# Patient Record
Sex: FEMALE | Race: WHITE | HISPANIC OR LATINO | Employment: OTHER | URBAN - METROPOLITAN AREA
[De-identification: names, ages, dates, MRNs, and addresses within clinical notes are randomized per-mention and may not be internally consistent; named-entity substitution may affect disease eponyms.]

---

## 2021-08-16 ENCOUNTER — TELEPHONE (OUTPATIENT)
Dept: OBSTETRICS AND GYNECOLOGY | Facility: CLINIC | Age: 61
End: 2021-08-16

## 2021-08-25 ENCOUNTER — OFFICE VISIT (OUTPATIENT)
Dept: OBSTETRICS AND GYNECOLOGY | Facility: CLINIC | Age: 61
End: 2021-08-25
Payer: MEDICARE

## 2021-08-25 VITALS — BODY MASS INDEX: 50.44 KG/M2 | WEIGHT: 293 LBS | DIASTOLIC BLOOD PRESSURE: 92 MMHG | SYSTOLIC BLOOD PRESSURE: 142 MMHG

## 2021-08-25 DIAGNOSIS — Z12.4 SCREENING FOR MALIGNANT NEOPLASM OF CERVIX: Primary | ICD-10-CM

## 2021-08-25 DIAGNOSIS — N84.1 CERVICAL POLYP: ICD-10-CM

## 2021-08-25 DIAGNOSIS — N95.0 PMB (POSTMENOPAUSAL BLEEDING): ICD-10-CM

## 2021-08-25 PROCEDURE — 3080F DIAST BP >= 90 MM HG: CPT | Mod: CPTII,S$GLB,, | Performed by: NURSE PRACTITIONER

## 2021-08-25 PROCEDURE — G0101 CA SCREEN;PELVIC/BREAST EXAM: HCPCS | Mod: 25,S$GLB,, | Performed by: NURSE PRACTITIONER

## 2021-08-25 PROCEDURE — 3077F SYST BP >= 140 MM HG: CPT | Mod: CPTII,S$GLB,, | Performed by: NURSE PRACTITIONER

## 2021-08-25 PROCEDURE — 3080F PR MOST RECENT DIASTOLIC BLOOD PRESSURE >= 90 MM HG: ICD-10-PCS | Mod: CPTII,S$GLB,, | Performed by: NURSE PRACTITIONER

## 2021-08-25 PROCEDURE — 58100 PR BIOPSY OF UTERUS LINING: ICD-10-PCS | Mod: S$GLB,,, | Performed by: NURSE PRACTITIONER

## 2021-08-25 PROCEDURE — 3008F BODY MASS INDEX DOCD: CPT | Mod: CPTII,S$GLB,, | Performed by: NURSE PRACTITIONER

## 2021-08-25 PROCEDURE — 1159F MED LIST DOCD IN RCRD: CPT | Mod: CPTII,S$GLB,, | Performed by: NURSE PRACTITIONER

## 2021-08-25 PROCEDURE — 1160F PR REVIEW ALL MEDS BY PRESCRIBER/CLIN PHARMACIST DOCUMENTED: ICD-10-PCS | Mod: CPTII,S$GLB,, | Performed by: NURSE PRACTITIONER

## 2021-08-25 PROCEDURE — 88305 TISSUE EXAM BY PATHOLOGIST: ICD-10-PCS | Mod: 26,,, | Performed by: PATHOLOGY

## 2021-08-25 PROCEDURE — 99999 PR PBB SHADOW E&M-EST. PATIENT-LVL IV: ICD-10-PCS | Mod: PBBFAC,,, | Performed by: NURSE PRACTITIONER

## 2021-08-25 PROCEDURE — 1126F PR PAIN SEVERITY QUANTIFIED, NO PAIN PRESENT: ICD-10-PCS | Mod: CPTII,S$GLB,, | Performed by: NURSE PRACTITIONER

## 2021-08-25 PROCEDURE — 1160F RVW MEDS BY RX/DR IN RCRD: CPT | Mod: CPTII,S$GLB,, | Performed by: NURSE PRACTITIONER

## 2021-08-25 PROCEDURE — 99999 PR PBB SHADOW E&M-EST. PATIENT-LVL IV: CPT | Mod: PBBFAC,,, | Performed by: NURSE PRACTITIONER

## 2021-08-25 PROCEDURE — 88305 TISSUE EXAM BY PATHOLOGIST: CPT | Mod: 26,,, | Performed by: PATHOLOGY

## 2021-08-25 PROCEDURE — G0101 PR CA SCREEN;PELVIC/BREAST EXAM: ICD-10-PCS | Mod: 25,S$GLB,, | Performed by: NURSE PRACTITIONER

## 2021-08-25 PROCEDURE — 58100 BIOPSY OF UTERUS LINING: CPT | Mod: S$GLB,,, | Performed by: NURSE PRACTITIONER

## 2021-08-25 PROCEDURE — 88175 CYTOPATH C/V AUTO FLUID REDO: CPT | Performed by: NURSE PRACTITIONER

## 2021-08-25 PROCEDURE — 1126F AMNT PAIN NOTED NONE PRSNT: CPT | Mod: CPTII,S$GLB,, | Performed by: NURSE PRACTITIONER

## 2021-08-25 PROCEDURE — 88305 TISSUE EXAM BY PATHOLOGIST: CPT | Mod: 59 | Performed by: PATHOLOGY

## 2021-08-25 PROCEDURE — 3008F PR BODY MASS INDEX (BMI) DOCUMENTED: ICD-10-PCS | Mod: CPTII,S$GLB,, | Performed by: NURSE PRACTITIONER

## 2021-08-25 PROCEDURE — 3077F PR MOST RECENT SYSTOLIC BLOOD PRESSURE >= 140 MM HG: ICD-10-PCS | Mod: CPTII,S$GLB,, | Performed by: NURSE PRACTITIONER

## 2021-08-25 PROCEDURE — 1159F PR MEDICATION LIST DOCUMENTED IN MEDICAL RECORD: ICD-10-PCS | Mod: CPTII,S$GLB,, | Performed by: NURSE PRACTITIONER

## 2021-08-25 PROCEDURE — 87624 HPV HI-RISK TYP POOLED RSLT: CPT | Performed by: NURSE PRACTITIONER

## 2021-08-25 RX ORDER — CEPHALEXIN 500 MG/1
CAPSULE ORAL
COMMUNITY
End: 2024-03-25

## 2021-08-25 RX ORDER — SULFACETAMIDE SODIUM AND PREDNISOLONE SODIUM PHOSPHATE 100; 2.3 MG/ML; MG/ML
SOLUTION/ DROPS OPHTHALMIC
COMMUNITY

## 2021-08-25 RX ORDER — METOPROLOL SUCCINATE 25 MG/1
TABLET, EXTENDED RELEASE ORAL
COMMUNITY
Start: 2021-03-18

## 2021-08-25 RX ORDER — MELOXICAM 15 MG/1
TABLET ORAL
COMMUNITY
Start: 2021-02-08

## 2021-08-25 RX ORDER — LEVOCETIRIZINE DIHYDROCHLORIDE 5 MG/1
TABLET, FILM COATED ORAL
COMMUNITY
End: 2024-03-25

## 2021-08-25 RX ORDER — ATORVASTATIN CALCIUM 20 MG/1
TABLET, FILM COATED ORAL
COMMUNITY
Start: 2021-08-12

## 2021-08-25 RX ORDER — LISINOPRIL 40 MG/1
TABLET ORAL
COMMUNITY
Start: 2021-06-15

## 2021-08-25 RX ORDER — DICLOFENAC SODIUM 10 MG/G
GEL TOPICAL
COMMUNITY
Start: 2020-09-08

## 2021-08-25 RX ORDER — LANCETS
EACH MISCELLANEOUS
COMMUNITY
Start: 2020-01-27

## 2021-08-25 RX ORDER — ALBUTEROL SULFATE 90 UG/1
AEROSOL, METERED RESPIRATORY (INHALATION)
COMMUNITY
Start: 2013-06-27

## 2021-08-25 RX ORDER — AMLODIPINE BESYLATE 10 MG/1
TABLET ORAL
COMMUNITY
Start: 2021-03-18

## 2021-08-25 RX ORDER — CYCLOBENZAPRINE HCL 10 MG
TABLET ORAL
COMMUNITY
Start: 2021-05-18

## 2021-08-25 RX ORDER — DEXTROSE 4 G
TABLET,CHEWABLE ORAL
COMMUNITY
Start: 2020-01-27

## 2021-08-25 RX ORDER — CEFDINIR 250 MG/5ML
POWDER, FOR SUSPENSION ORAL
COMMUNITY
End: 2024-03-25

## 2021-08-25 RX ORDER — OXYCODONE AND ACETAMINOPHEN 10; 325 MG/1; MG/1
TABLET ORAL
COMMUNITY
Start: 2021-07-15

## 2021-08-25 RX ORDER — FUROSEMIDE 40 MG/1
TABLET ORAL
COMMUNITY
Start: 2020-07-24

## 2021-08-25 RX ORDER — MECLIZINE HYDROCHLORIDE 25 MG/1
TABLET ORAL
COMMUNITY
Start: 2017-09-18 | End: 2024-03-25

## 2021-08-25 RX ORDER — AZELASTINE 1 MG/ML
SPRAY, METERED NASAL
COMMUNITY
End: 2024-03-25 | Stop reason: ALTCHOICE

## 2021-08-25 RX ORDER — HYDROCHLOROTHIAZIDE 25 MG/1
TABLET ORAL
COMMUNITY
Start: 2021-01-19

## 2021-08-25 RX ORDER — HYDROGEN PEROXIDE 3 %
SOLUTION, NON-ORAL MISCELLANEOUS
COMMUNITY

## 2021-08-25 RX ORDER — OXYBUTYNIN CHLORIDE 10 MG/1
TABLET, EXTENDED RELEASE ORAL
COMMUNITY
Start: 2021-04-23

## 2021-08-25 RX ORDER — ONDANSETRON 4 MG/1
TABLET, ORALLY DISINTEGRATING ORAL
COMMUNITY
Start: 2020-08-28 | End: 2024-03-25

## 2021-08-25 RX ORDER — METFORMIN HYDROCHLORIDE 500 MG/5ML
SOLUTION ORAL
COMMUNITY
Start: 2021-01-19 | End: 2024-03-25

## 2021-08-25 RX ORDER — CETIRIZINE HYDROCHLORIDE 10 MG/1
1 TABLET ORAL DAILY
COMMUNITY
Start: 2020-12-16 | End: 2024-03-25

## 2021-08-25 RX ORDER — ESOMEPRAZOLE MAGNESIUM 40 MG/1
CAPSULE, DELAYED RELEASE ORAL
COMMUNITY
Start: 2021-04-26

## 2021-08-25 RX ORDER — ENOXAPARIN SODIUM 100 MG/ML
INJECTION SUBCUTANEOUS
COMMUNITY

## 2021-08-25 RX ORDER — GABAPENTIN 300 MG/1
CAPSULE ORAL
COMMUNITY
Start: 2021-05-18

## 2021-08-25 RX ORDER — MONTELUKAST SODIUM 10 MG/1
TABLET ORAL
COMMUNITY
Start: 2020-12-16 | End: 2024-03-25

## 2021-08-25 RX ORDER — OXYCODONE HYDROCHLORIDE 10 MG/1
TABLET ORAL
COMMUNITY
End: 2024-03-25

## 2021-08-25 RX ORDER — ALBUTEROL SULFATE 0.83 MG/ML
SOLUTION RESPIRATORY (INHALATION)
COMMUNITY
Start: 2018-01-06

## 2021-08-25 RX ORDER — SERTRALINE HYDROCHLORIDE 50 MG/1
TABLET, FILM COATED ORAL
COMMUNITY
Start: 2020-10-07 | End: 2024-03-25

## 2021-08-26 ENCOUNTER — HOSPITAL ENCOUNTER (OUTPATIENT)
Dept: RADIOLOGY | Facility: OTHER | Age: 61
Discharge: HOME | End: 2021-08-26
Attending: PSYCHIATRY & NEUROLOGY
Payer: MEDICARE

## 2021-08-26 DIAGNOSIS — R13.10 DYSPHAGIA: ICD-10-CM

## 2021-08-26 PROCEDURE — 74220 FL ESOPHAGRAM COMPLETE: ICD-10-PCS | Mod: 26,,, | Performed by: RADIOLOGY

## 2021-08-26 PROCEDURE — 74230 X-RAY XM SWLNG FUNCJ C+: CPT | Mod: 26,,, | Performed by: RADIOLOGY

## 2021-08-26 PROCEDURE — 25500020 PHARM REV CODE 255: Performed by: PSYCHIATRY & NEUROLOGY

## 2021-08-26 PROCEDURE — A9698 NON-RAD CONTRAST MATERIALNOC: HCPCS | Performed by: PSYCHIATRY & NEUROLOGY

## 2021-08-26 PROCEDURE — 74220 X-RAY XM ESOPHAGUS 1CNTRST: CPT | Mod: 26,,, | Performed by: RADIOLOGY

## 2021-08-26 PROCEDURE — 74230 FL MODIFIED BARIUM SWALLOW SPEECH STUDY: ICD-10-PCS | Mod: 26,,, | Performed by: RADIOLOGY

## 2021-08-26 PROCEDURE — 74230 X-RAY XM SWLNG FUNCJ C+: CPT | Mod: TC

## 2021-08-26 PROCEDURE — 74220 X-RAY XM ESOPHAGUS 1CNTRST: CPT | Mod: TC

## 2021-08-26 PROCEDURE — 92611 MOTION FLUOROSCOPY/SWALLOW: CPT

## 2021-08-26 RX ADMIN — BARIUM SULFATE 200 ML: 0.6 SUSPENSION ORAL at 10:08

## 2021-08-26 RX ADMIN — BARIUM SULFATE 50 ML: 0.81 POWDER, FOR SUSPENSION ORAL at 10:08

## 2021-09-01 LAB
CLINICAL INFO: NORMAL
CYTO CVX: NORMAL
CYTOLOGIST CVX/VAG CYTO: NORMAL
CYTOLOGIST CVX/VAG CYTO: NORMAL
CYTOLOGY CMNT CVX/VAG CYTO-IMP: NORMAL
CYTOLOGY PAP THIN PREP EXPLANATION: NORMAL
DATE OF PREVIOUS PAP: NORMAL
DATE PREVIOUS BX: NO
FINAL PATHOLOGIC DIAGNOSIS: NORMAL
FINAL PATHOLOGIC DIAGNOSIS: NORMAL
GEN CATEG CVX/VAG CYTO-IMP: NORMAL
GROSS: NORMAL
GROSS: NORMAL
HPV I/H RISK 4 DNA CVX QL NAA+PROBE: NOT DETECTED
LMP START DATE: NORMAL
Lab: NORMAL
Lab: NORMAL
MICROORGANISM CVX/VAG CYTO: NORMAL
PATHOLOGIST CVX/VAG CYTO: NORMAL
SERVICE CMNT-IMP: NORMAL
SPECIMEN SOURCE CVX/VAG CYTO: NORMAL
STAT OF ADQ CVX/VAG CYTO-IMP: NORMAL

## 2023-05-01 ENCOUNTER — HOSPITAL ENCOUNTER (EMERGENCY)
Facility: HOSPITAL | Age: 63
Discharge: HOME | End: 2023-05-01
Attending: EMERGENCY MEDICINE
Payer: MEDICARE

## 2023-05-01 VITALS
WEIGHT: 293 LBS | OXYGEN SATURATION: 99 % | SYSTOLIC BLOOD PRESSURE: 180 MMHG | RESPIRATION RATE: 20 BRPM | DIASTOLIC BLOOD PRESSURE: 81 MMHG | HEART RATE: 69 BPM | TEMPERATURE: 98 F
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
## 2023-05-01 DIAGNOSIS — M25.511 RIGHT SHOULDER PAIN: ICD-10-CM

## 2023-05-01 DIAGNOSIS — M25.569 KNEE PAIN: ICD-10-CM

## 2023-05-01 DIAGNOSIS — M54.6 THORACIC BACK PAIN: ICD-10-CM

## 2023-05-01 PROCEDURE — 63600175 PHARM REV CODE 636 W HCPCS: Mod: ER | Performed by: EMERGENCY MEDICINE

## 2023-05-01 PROCEDURE — 96372 THER/PROPH/DIAG INJ SC/IM: CPT | Performed by: EMERGENCY MEDICINE

## 2023-05-01 PROCEDURE — 99284 EMERGENCY DEPT VISIT MOD MDM: CPT | Mod: 25,ER

## 2023-05-01 RX ORDER — KETOROLAC TROMETHAMINE 30 MG/ML
15 INJECTION, SOLUTION INTRAMUSCULAR; INTRAVENOUS
Status: COMPLETED | OUTPATIENT
Start: 2023-05-01 | End: 2023-05-01

## 2023-05-01 RX ORDER — CYCLOBENZAPRINE HCL 10 MG
10 TABLET ORAL 3 TIMES DAILY PRN
Qty: 15 TABLET | Refills: 0 | Status: SHIPPED | OUTPATIENT
Start: 2023-05-01 | End: 2023-05-06

## 2023-05-01 RX ADMIN — KETOROLAC TROMETHAMINE 15 MG: 30 INJECTION, SOLUTION INTRAMUSCULAR; INTRAVENOUS at 10:05

## 2023-05-02 NOTE — ED NOTES
Pt medicated per MAR. Xray at bedside. Pt is resting on stretcher, call light in reach and family at bedside.
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
## 2023-06-23 ENCOUNTER — HOSPITAL ENCOUNTER (EMERGENCY)
Facility: HOSPITAL | Age: 63
Discharge: HOME | End: 2023-06-23
Attending: STUDENT IN AN ORGANIZED HEALTH CARE EDUCATION/TRAINING PROGRAM
Payer: MEDICARE

## 2023-06-23 VITALS
WEIGHT: 293 LBS | HEART RATE: 68 BPM | HEIGHT: 65 IN | SYSTOLIC BLOOD PRESSURE: 188 MMHG | TEMPERATURE: 98 F | BODY MASS INDEX: 48.82 KG/M2 | RESPIRATION RATE: 18 BRPM | OXYGEN SATURATION: 95 % | DIASTOLIC BLOOD PRESSURE: 93 MMHG

## 2023-06-23 DIAGNOSIS — M79.89 LEG SWELLING: ICD-10-CM

## 2023-06-23 DIAGNOSIS — R60.0 BILATERAL LOWER EXTREMITY EDEMA: Primary | ICD-10-CM

## 2023-06-23 DIAGNOSIS — R21 RASH: ICD-10-CM

## 2023-06-23 PROBLEM — M17.12 PRIMARY OSTEOARTHRITIS OF LEFT KNEE: Status: ACTIVE | Noted: 2019-06-12

## 2023-06-23 PROBLEM — D25.9 UTERINE LEIOMYOMA: Status: ACTIVE | Noted: 2021-04-23

## 2023-06-23 PROBLEM — E11.9 DIABETES MELLITUS: Status: ACTIVE | Noted: 2023-06-23

## 2023-06-23 PROBLEM — M79.672 LEFT FOOT PAIN: Status: ACTIVE | Noted: 2023-02-22

## 2023-06-23 PROBLEM — F11.20 UNCOMPLICATED OPIOID DEPENDENCE: Status: ACTIVE | Noted: 2018-01-11

## 2023-06-23 PROBLEM — E66.01 MORBID OBESITY WITH BODY MASS INDEX OF 50.0-59.9 IN ADULT: Status: ACTIVE | Noted: 2022-01-14

## 2023-06-23 PROBLEM — R31.21 ASYMPTOMATIC MICROSCOPIC HEMATURIA: Status: ACTIVE | Noted: 2021-04-23

## 2023-06-23 PROBLEM — N32.81 OAB (OVERACTIVE BLADDER): Status: ACTIVE | Noted: 2021-04-23

## 2023-06-23 PROBLEM — M17.11 PRIMARY OSTEOARTHRITIS OF RIGHT KNEE: Status: ACTIVE | Noted: 2020-08-31

## 2023-06-23 PROBLEM — Z96.641 STATUS POST RIGHT HIP REPLACEMENT: Status: ACTIVE | Noted: 2020-09-02

## 2023-06-23 LAB
ALBUMIN SERPL BCP-MCNC: 3.8 G/DL (ref 3.5–5.2)
ALP SERPL-CCNC: 96 U/L (ref 55–135)
ALT SERPL W/O P-5'-P-CCNC: 17 U/L (ref 10–44)
ANION GAP SERPL CALC-SCNC: 11 MMOL/L (ref 8–16)
AST SERPL-CCNC: 16 U/L (ref 10–40)
BACTERIA #/AREA URNS AUTO: ABNORMAL /HPF
BASOPHILS # BLD AUTO: 0.03 K/UL (ref 0–0.2)
BASOPHILS NFR BLD: 0.4 % (ref 0–1.9)
BILIRUB SERPL-MCNC: 0.6 MG/DL (ref 0.1–1)
BILIRUB UR QL STRIP: NEGATIVE
BNP SERPL-MCNC: 102 PG/ML (ref 0–99)
BUN SERPL-MCNC: 14 MG/DL (ref 8–23)
CALCIUM SERPL-MCNC: 9.6 MG/DL (ref 8.7–10.5)
CAOX CRY UR QL COMP ASSIST: ABNORMAL
CHLORIDE SERPL-SCNC: 106 MMOL/L (ref 95–110)
CLARITY UR REFRACT.AUTO: CLEAR
CO2 SERPL-SCNC: 26 MMOL/L (ref 23–29)
COLOR UR AUTO: YELLOW
CREAT SERPL-MCNC: 0.8 MG/DL (ref 0.5–1.4)
DIFFERENTIAL METHOD: ABNORMAL
EOSINOPHIL # BLD AUTO: 0.2 K/UL (ref 0–0.5)
EOSINOPHIL NFR BLD: 2.8 % (ref 0–8)
ERYTHROCYTE [DISTWIDTH] IN BLOOD BY AUTOMATED COUNT: 13.9 % (ref 11.5–14.5)
EST. GFR  (NO RACE VARIABLE): >60 ML/MIN/1.73 M^2
GLUCOSE SERPL-MCNC: 109 MG/DL (ref 70–110)
GLUCOSE UR QL STRIP: NEGATIVE
HCT VFR BLD AUTO: 44.3 % (ref 37–48.5)
HGB BLD-MCNC: 14.1 G/DL (ref 12–16)
HGB UR QL STRIP: ABNORMAL
IMM GRANULOCYTES # BLD AUTO: 0.01 K/UL (ref 0–0.04)
IMM GRANULOCYTES NFR BLD AUTO: 0.1 % (ref 0–0.5)
KETONES UR QL STRIP: NEGATIVE
LEUKOCYTE ESTERASE UR QL STRIP: ABNORMAL
LYMPHOCYTES # BLD AUTO: 1.7 K/UL (ref 1–4.8)
LYMPHOCYTES NFR BLD: 24.7 % (ref 18–48)
MCH RBC QN AUTO: 28.8 PG (ref 27–31)
MCHC RBC AUTO-ENTMCNC: 31.8 G/DL (ref 32–36)
MCV RBC AUTO: 91 FL (ref 82–98)
MICROSCOPIC COMMENT: ABNORMAL
MONOCYTES # BLD AUTO: 0.4 K/UL (ref 0.3–1)
MONOCYTES NFR BLD: 5.6 % (ref 4–15)
NEUTROPHILS # BLD AUTO: 4.5 K/UL (ref 1.8–7.7)
NEUTROPHILS NFR BLD: 66.4 % (ref 38–73)
NITRITE UR QL STRIP: NEGATIVE
NRBC BLD-RTO: 0 /100 WBC
PH UR STRIP: 6 [PH] (ref 5–8)
PLATELET # BLD AUTO: 231 K/UL (ref 150–450)
PMV BLD AUTO: 10.6 FL (ref 9.2–12.9)
POTASSIUM SERPL-SCNC: 3.6 MMOL/L (ref 3.5–5.1)
PROT SERPL-MCNC: 7 G/DL (ref 6–8.4)
PROT UR QL STRIP: ABNORMAL
RBC # BLD AUTO: 4.89 M/UL (ref 4–5.4)
RBC #/AREA URNS AUTO: 18 /HPF (ref 0–4)
SODIUM SERPL-SCNC: 143 MMOL/L (ref 136–145)
SP GR UR STRIP: 1.02 (ref 1–1.03)
SQUAMOUS #/AREA URNS AUTO: 8 /HPF
URN SPEC COLLECT METH UR: ABNORMAL
WBC # BLD AUTO: 6.77 K/UL (ref 3.9–12.7)
WBC #/AREA URNS AUTO: 20 /HPF (ref 0–5)

## 2023-06-23 PROCEDURE — 93010 EKG 12-LEAD: ICD-10-PCS | Mod: ,,, | Performed by: INTERNAL MEDICINE

## 2023-06-23 PROCEDURE — 87086 URINE CULTURE/COLONY COUNT: CPT | Performed by: PHYSICIAN ASSISTANT

## 2023-06-23 PROCEDURE — 80053 COMPREHEN METABOLIC PANEL: CPT | Performed by: PHYSICIAN ASSISTANT

## 2023-06-23 PROCEDURE — 93010 ELECTROCARDIOGRAM REPORT: CPT | Mod: ,,, | Performed by: INTERNAL MEDICINE

## 2023-06-23 PROCEDURE — 85025 COMPLETE CBC W/AUTO DIFF WBC: CPT | Performed by: PHYSICIAN ASSISTANT

## 2023-06-23 PROCEDURE — 81001 URINALYSIS AUTO W/SCOPE: CPT | Performed by: PHYSICIAN ASSISTANT

## 2023-06-23 PROCEDURE — 93005 ELECTROCARDIOGRAM TRACING: CPT

## 2023-06-23 PROCEDURE — 25000003 PHARM REV CODE 250: Performed by: PHYSICIAN ASSISTANT

## 2023-06-23 PROCEDURE — 99285 EMERGENCY DEPT VISIT HI MDM: CPT | Mod: 25

## 2023-06-23 PROCEDURE — 83880 ASSAY OF NATRIURETIC PEPTIDE: CPT | Performed by: PHYSICIAN ASSISTANT

## 2023-06-23 RX ORDER — HYDROXYZINE PAMOATE 25 MG/1
25 CAPSULE ORAL
Status: COMPLETED | OUTPATIENT
Start: 2023-06-23 | End: 2023-06-23

## 2023-06-23 RX ORDER — DIPHENOXYLATE HYDROCHLORIDE AND ATROPINE SULFATE 2.5; .025 MG/1; MG/1
1 TABLET ORAL 3 TIMES DAILY PRN
COMMUNITY
Start: 2023-06-02 | End: 2024-03-25

## 2023-06-23 RX ORDER — HYDROCHLOROTHIAZIDE 25 MG/1
50 TABLET ORAL
Status: COMPLETED | OUTPATIENT
Start: 2023-06-23 | End: 2023-06-23

## 2023-06-23 RX ORDER — HYDROCODONE BITARTRATE 40 MG/1
TABLET, EXTENDED RELEASE ORAL
COMMUNITY

## 2023-06-23 RX ORDER — HYDROCORTISONE 25 MG/G
CREAM TOPICAL 2 TIMES DAILY
Qty: 20 G | Refills: 0 | Status: SHIPPED | OUTPATIENT
Start: 2023-06-23

## 2023-06-23 RX ORDER — SUVOREXANT 20 MG/1
1 TABLET, FILM COATED ORAL NIGHTLY
COMMUNITY
Start: 2023-05-15

## 2023-06-23 RX ADMIN — HYDROCHLOROTHIAZIDE 50 MG: 25 TABLET ORAL at 05:06

## 2023-06-23 RX ADMIN — HYDROXYZINE PAMOATE 25 MG: 25 CAPSULE ORAL at 10:06

## 2023-06-23 RX ADMIN — HYDROXYZINE PAMOATE 25 MG: 25 CAPSULE ORAL at 05:06
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
## 2023-06-24 NOTE — DISCHARGE INSTRUCTIONS
Diagnosis: Bilateral lower extremity edema, rash    I am not sure what is causing your leg swelling.  Your ultrasound did not show any evidence of blood clots in your lab tests did not show any concerning findings.  I think the rash may be due to bug bites or some other irritant, I am prescribing a steroid cream to help with itching.    I sent a referral to our Cardiology doctors for follow-up.  Please call to schedule a follow-up appointment.  If you start to have any worsening symptoms, please return to the emergency department.

## 2023-06-25 LAB
BACTERIA UR CULT: NORMAL
BACTERIA UR CULT: NORMAL

## 2023-09-22 DIAGNOSIS — M79.606 PAIN AND SWELLING OF LOWER EXTREMITY, UNSPECIFIED LATERALITY: Primary | ICD-10-CM

## 2023-09-22 DIAGNOSIS — I80.03 PHLEBITIS AND THROMBOPHLEBITIS OF SUPERFICIAL VESSELS OF LOWER EXTREMITIES, BILATERAL: ICD-10-CM

## 2023-09-22 DIAGNOSIS — M79.89 PAIN AND SWELLING OF LOWER EXTREMITY, UNSPECIFIED LATERALITY: Primary | ICD-10-CM

## 2023-10-18 ENCOUNTER — OFFICE VISIT (OUTPATIENT)
Dept: VASCULAR SURGERY | Facility: CLINIC | Age: 63
End: 2023-10-18
Payer: MEDICARE

## 2023-10-18 ENCOUNTER — HOSPITAL ENCOUNTER (OUTPATIENT)
Dept: RADIOLOGY | Facility: OTHER | Age: 63
Discharge: HOME | End: 2023-10-18
Attending: SURGERY
Payer: MEDICARE

## 2023-10-18 VITALS — HEIGHT: 65 IN | BODY MASS INDEX: 48.82 KG/M2 | WEIGHT: 293 LBS

## 2023-10-18 DIAGNOSIS — I80.03 PHLEBITIS AND THROMBOPHLEBITIS OF SUPERFICIAL VESSELS OF LOWER EXTREMITIES, BILATERAL: ICD-10-CM

## 2023-10-18 DIAGNOSIS — I89.0 LYMPHEDEMA: ICD-10-CM

## 2023-10-18 DIAGNOSIS — M79.606 PAIN AND SWELLING OF LOWER EXTREMITY, UNSPECIFIED LATERALITY: ICD-10-CM

## 2023-10-18 DIAGNOSIS — I89.0 LYMPHEDEMA: Primary | ICD-10-CM

## 2023-10-18 DIAGNOSIS — I87.2 VENOUS INSUFFICIENCY: Primary | ICD-10-CM

## 2023-10-18 DIAGNOSIS — M79.89 PAIN AND SWELLING OF LOWER EXTREMITY, UNSPECIFIED LATERALITY: ICD-10-CM

## 2023-10-18 PROCEDURE — 93970 EXTREMITY STUDY: CPT | Mod: TC

## 2023-10-18 PROCEDURE — 3008F PR BODY MASS INDEX (BMI) DOCUMENTED: ICD-10-PCS | Mod: CPTII,S$GLB,, | Performed by: SURGERY

## 2023-10-18 PROCEDURE — 4010F ACE/ARB THERAPY RXD/TAKEN: CPT | Mod: CPTII,S$GLB,, | Performed by: SURGERY

## 2023-10-18 PROCEDURE — 4010F PR ACE/ARB THEARPY RXD/TAKEN: ICD-10-PCS | Mod: CPTII,S$GLB,, | Performed by: SURGERY

## 2023-10-18 PROCEDURE — 1159F MED LIST DOCD IN RCRD: CPT | Mod: CPTII,S$GLB,, | Performed by: SURGERY

## 2023-10-18 PROCEDURE — 93970 US LOWER EXTREMITY VEINS BILATERAL INSUFFICIENCY: ICD-10-PCS | Mod: 26,,, | Performed by: RADIOLOGY

## 2023-10-18 PROCEDURE — 99204 OFFICE O/P NEW MOD 45 MIN: CPT | Mod: S$GLB,,, | Performed by: SURGERY

## 2023-10-18 PROCEDURE — 93970 EXTREMITY STUDY: CPT | Mod: 26,,, | Performed by: RADIOLOGY

## 2023-10-18 PROCEDURE — 3008F BODY MASS INDEX DOCD: CPT | Mod: CPTII,S$GLB,, | Performed by: SURGERY

## 2023-10-18 PROCEDURE — 99204 PR OFFICE/OUTPT VISIT, NEW, LEVL IV, 45-59 MIN: ICD-10-PCS | Mod: S$GLB,,, | Performed by: SURGERY

## 2023-10-18 PROCEDURE — 1159F PR MEDICATION LIST DOCUMENTED IN MEDICAL RECORD: ICD-10-PCS | Mod: CPTII,S$GLB,, | Performed by: SURGERY

## 2023-10-18 NOTE — PROGRESS NOTES
Broderick Canales MD, RPVI                                 [facility]                           [facility]                             10/18/2023    HPI:  Estrella De Oliveira is a 63 y.o. female with   Patient Active Problem List   Diagnosis    Airway hyperreactivity    Arthritis of knee    Asymptomatic microscopic hematuria    Blind left eye    Cervical pain    Chronic bilateral low back pain without sciatica    Chronic pain of left knee    Depression with anxiety    GERD (gastroesophageal reflux disease)    Hepatitis    Hyperlipidemia associated with type 2 diabetes mellitus    Hypertension associated with diabetes    Insomnia    Left foot pain    Lumbar spondylosis    Morbid obesity with body mass index of 50.0-59.9 in adult    OAB (overactive bladder)    Osteoarthritis    Primary osteoarthritis of left knee    Primary osteoarthritis of right knee    RLS (restless legs syndrome)    Status post right hip replacement    Diabetes mellitus    Type 2 diabetes mellitus with diabetic retinopathy    Uncomplicated opioid dependence    Uterine leiomyoma    being managed by PCP and specialists who is here today for evaluation of BLE edema.  Patient states location is BLE occurring for yrs.  Associated signs and symptoms include discoloration.  Quality is heavy and severity is 9/10.  Symptoms began yrs ago.  Alleviating factors include elevation.  Worsening factors include dependency.  Patient has not been wearing compression stockings for greater than 3 months.  Symptoms do limit patient's functional status and daily activities.  no DVT history.  no venous interventions.  no low sodium diet.  no excessive water intake.    Migraine with aura: no  PFO/ASD/right to left shunt: no  Pregnant: no  Breastfeeding: no    no MI  no Stroke  Tobacco use: denies    Past Medical History:   Diagnosis Date    Essential (primary) hypertension     Legally blind in left eye, as defined in [place]     Type 2 diabetes  mellitus with unspecified diabetic retinopathy without macular edema      Past Surgical History:   Procedure Laterality Date    GALLBLADDER SURGERY      [place] ANTOLIN,SWALLOW FUNCTION,CINE/VIDEO RECORD  8/26/2021         REVISION OF TOTAL KNEE REPLACEMENT        Family History   Problem Relation Age of Onset    Colon cancer Brother     Breast cancer Maternal Aunt     Ovarian cancer Neg Hx      Social History     Socioeconomic History    Marital status:    Tobacco Use    Smoking status: Never    Smokeless tobacco: Never   Substance and Sexual Activity    Alcohol use: Never    Drug use: Never    Sexual activity: Not Currently       Current Outpatient Medications:     albuterol (PROVENTIL) 2.5 mg /3 mL (0.083 %) nebulizer solution, albuterol sulfate 2.5 mg/3 mL (0.083 %) solution for nebulization, Disp: , Rfl:     albuterol (VENTOLIN HFA) 90 mcg/actuation inhaler, Ventolin HFA 90 mcg/actuation aerosol inhaler, Disp: , Rfl:     amLODIPine (NORVASC) 10 MG tablet, amlodipine 10 mg tablet, Disp: , Rfl:     atorvastatin (LIPITOR) 20 MG tablet, Take by mouth., Disp: , Rfl:     azelastine (ASTELIN) 137 mcg (0.1 %) nasal spray, azelastine 137 mcg (0.1 %) nasal spray aerosol, Disp: , Rfl:     blood sugar diagnostic (BLOOD GLUCOSE TEST) Strp, Check glucose twice daily and as needed, Disp: , Rfl:     blood-glucose meter Misc, Glucose twice daily and as needed, Disp: , Rfl:     cefdinir (OMNICEF) 250 mg/5 mL suspension, cefdinir 250 mg/5 mL oral suspension, Disp: , Rfl:     cephALEXin (KEFLEX) 500 MG capsule, cephalexin 500 mg capsule, Disp: , Rfl:     cetirizine (ZYRTEC) 10 MG tablet, Take 1 tablet by mouth once daily., Disp: , Rfl:     cyclobenzaprine (FLEXERIL) 10 MG tablet, cyclobenzaprine 10 mg tablet, Disp: , Rfl:     diclofenac sodium (VOLTAREN) 1 % Gel, diclofenac 1 % topical gel, Disp: , Rfl:     diphenoxylate-atropine 2.5-0.025 mg (LOMOTIL) 2.5-0.025 mg per tablet, Take 1 tablet by mouth 3 (three) times daily as  needed., Disp: , Rfl:     enoxaparin (LOVENOX) 40 mg/0.4 mL Syrg, enoxaparin 40 mg/0.4 mL subcutaneous syringe, Disp: , Rfl:     esomeprazole (NEXIUM) 20 MG capsule, Nexium, Disp: , Rfl:     esomeprazole (NEXIUM) 40 MG capsule, esomeprazole magnesium 40 mg capsule,delayed release, Disp: , Rfl:     furosemide (LASIX) 40 MG tablet, furosemide 40 mg tablet, Disp: , Rfl:     gabapentin (NEURONTIN) 300 MG capsule, gabapentin 300 mg capsule, Disp: , Rfl:     hydroCHLOROthiazide (HYDRODIURIL) 25 MG tablet, hydrochlorothiazide 25 mg tablet, Disp: , Rfl:     HYDROcodone bitartrate (HYSINGLA ER) 40 mg TP24, Hysingla ER 40 mg tablet, crush resistant, extended release, Disp: , Rfl:     hydrocortisone 2.5 % cream, Apply topically 2 (two) times daily., Disp: 20 g, Rfl: 0    lancets Misc, Check glucose twice daily and as needed, Disp: , Rfl:     levocetirizine (XYZAL) 5 MG tablet, levocetirizine 5 mg tablet, Disp: , Rfl:     lisinopriL (PRINIVIL,ZESTRIL) 40 MG tablet, SMARTSI Tablet(s) By Mouth Daily, Disp: , Rfl:     meclizine (ANTIVERT) 25 mg tablet, meclizine 25 mg tablet, Disp: , Rfl:     meloxicam (MOBIC) 15 MG tablet, meloxicam 15 mg tablet, Disp: , Rfl:     metFORMIN (RIOMET) 500 mg/5 mL Soln, Riomet 500 mg/5 mL oral solution, Disp: , Rfl:     metoprolol succinate (TOPROL-XL) 25 MG 24 hr tablet, metoprolol succinate ER 25 mg tablet,extended release 24 hr, Disp: , Rfl:     montelukast (SINGULAIR) 10 mg tablet, montelukast 10 mg tablet, Disp: , Rfl:     ondansetron (ZOFRAN-ODT) 4 MG TbDL, ondansetron 4 mg disintegrating tablet, Disp: , Rfl:     oxybutynin (DITROPAN-XL) 10 MG 24 hr tablet, oxybutynin chloride ER 10 mg tablet,extended release 24 hr, Disp: , Rfl:     oxyCODONE (ROXICODONE) 10 mg Tab immediate release tablet, oxycodone 10 mg tablet, Disp: , Rfl:     oxyCODONE-acetaminophen (PERCOCET)  mg per tablet, oxycodone-acetaminophen 10 mg-325 mg tablet, Disp: , Rfl:     sertraline (ZOLOFT) 50 MG tablet, sertraline  "50 mg tablet, Disp: , Rfl:     sulfacetamide-prednisoLONE 10%-0.23%, 0.25%, (VASOCIDIN) 10 %-0.23 % (0.25 %) Drop, sulfacetamide-prednisolone 10 %-0.23 % (0.25 %) eye drops, Disp: , Rfl:     suvorexant (BELSOMRA) 20 mg Tab, Take 1 tablet by mouth every evening., Disp: , Rfl:     REVIEW OF SYSTEMS:  General: No fevers or chills; ENT: No sore throat; Allergy and Immunology: no persistent infections; Hematological and Lymphatic: No history of bleeding or easy bruising; Endocrine: negative; Respiratory: no cough, shortness of breath, or wheezing; Cardiovascular: no chest pain or dyspnea on exertion; Gastrointestinal: no abdominal pain/back, change in bowel habits, or bloody stools; Genito-Urinary: no dysuria, trouble voiding, or hematuria; Musculoskeletal: edema; Neurological: no TIA or stroke symptoms; Psychiatric: no nervousness, anxiety or depression.    PHYSICAL EXAM:      Pulse: (P) 62         General appearance:  Alert, well-appearing, and in no distress.  Oriented to person, place, and time                    Neurological: Normal speech, no focal findings noted; CN II - XII grossly intact. RLE with sensation to light touch, LLE with sensation to light touch.            Musculoskeletal: Digits/nail without cyanosis/clubbing.  Strength 5/5 BLE.                    Neck: Supple, no significant adenopathy                  Chest:  No wheezes, symmetric air entry. No use of accessory muscles               Cardiac: Normal rate and regular rhythm            Abdomen: Soft, nontender, nondistended      Extremities:   1+ R DP pulse, 1+ L DP pulse      2+ RLE edema, 2+ LLE edema    Skin:  RLE no ulcer; LLE no ulcer      RLE no spider veins, LLE no spider veins      RLE no varicose veins, LLE no varicose veins    CEAP 3/3    VCSS 6    LAB RESULTS:  No results found for: "CBC"  Lab Results   Component Value Date    LABPROT 11.7 04/06/2023    INR 1.0 04/06/2023     Lab Results   Component Value Date     06/23/2023    K 3.6 " 06/23/2023     06/23/2023    CO2 26 06/23/2023     06/23/2023    BUN 14 06/23/2023    CREATININE 0.8 06/23/2023    CALCIUM 9.6 06/23/2023    ANIONGAP 11 06/23/2023    EGFRNONAA >60 05/09/2012     Lab Results   Component Value Date    WBC 6.77 06/23/2023    RBC 4.89 06/23/2023    HGB 14.1 06/23/2023    HCT 44.3 06/23/2023    MCV 91 06/23/2023    MCH 28.8 06/23/2023    MCHC 31.8 (L) 06/23/2023    RDW 13.9 06/23/2023     06/23/2023    MPV 10.6 06/23/2023    GRAN 4.5 06/23/2023    GRAN 66.4 06/23/2023    LYMPH 1.7 06/23/2023    LYMPH 24.7 06/23/2023    MONO 0.4 06/23/2023    MONO 5.6 06/23/2023    EOS 0.2 06/23/2023    BASO 0.03 06/23/2023    EOSINOPHIL 2.8 06/23/2023    BASOPHIL 0.4 06/23/2023    DIFFMETHOD Automated 06/23/2023     .  Lab Results   Component Value Date    HGBA1C 6.9 (H) 04/12/2021       IMAGING:  All pertinent imaging has been reviewed and interpreted independently.    Venous US 2023 Impression:  Color flow evaluation of the right lower extremity demonstrates no evidence of venous thrombosis in the deep or superficial veins, and no reflux.  Color flow evaluation of the left lower extremity demonstrates no evidence of venous thrombosis in the deep or superficial veins, and no reflux.      IMP/PLAN:  63 y.o. female with   Patient Active Problem List   Diagnosis    Airway hyperreactivity    Arthritis of knee    Asymptomatic microscopic hematuria    Blind left eye    Cervical pain    Chronic bilateral low back pain without sciatica    Chronic pain of left knee    Depression with anxiety    GERD (gastroesophageal reflux disease)    Hepatitis    Hyperlipidemia associated with type 2 diabetes mellitus    Hypertension associated with diabetes    Insomnia    Left foot pain    Lumbar spondylosis    Morbid obesity with body mass index of 50.0-59.9 in adult    OAB (overactive bladder)    Osteoarthritis    Primary osteoarthritis of left knee    Primary osteoarthritis of right knee    RLS  (restless legs syndrome)    Status post right hip replacement    Diabetes mellitus    Type 2 diabetes mellitus with diabetic retinopathy    Uncomplicated opioid dependence    Uterine leiomyoma    being managed by PCP and specialists who is here today for evaluation of BLE lymphedema.    -lymphedema clinic  -recommend compression with Rx stockings, elevation, dietary changes associated with water and sodium intake discussed at length with patient  -Exercise   -RTC 1 month for further evaluation    I spent 11 minutes evaluating this patient and greater than 50% of the time was spent counseling, coordinator care and discussing the plan of care.  All questions were answered and patient stated understanding with agreement with the above treatment plan.    Broderick Canales MD [facility]  Vascular and Endovascular Surgery

## 2023-11-14 ENCOUNTER — TELEPHONE (OUTPATIENT)
Dept: VASCULAR SURGERY | Facility: CLINIC | Age: 63
End: 2023-11-14
Payer: MEDICARE

## 2023-11-14 NOTE — TELEPHONE ENCOUNTER
Returned patient's called. Rescheduled f/u appointment to December because she hasn't been to lymphedema therapy or gotten compression stockings yet.

## 2023-12-05 ENCOUNTER — CLINICAL SUPPORT (OUTPATIENT)
Dept: REHABILITATION | Facility: HOSPITAL | Age: 63
End: 2023-12-05
Payer: MEDICAID

## 2023-12-05 DIAGNOSIS — M79.89 LEG SWELLING: Primary | ICD-10-CM

## 2023-12-05 DIAGNOSIS — I87.2 VENOUS INSUFFICIENCY: ICD-10-CM

## 2023-12-05 DIAGNOSIS — I89.0 LYMPHEDEMA: ICD-10-CM

## 2023-12-05 PROCEDURE — 97162 PT EVAL MOD COMPLEX 30 MIN: CPT

## 2023-12-05 PROCEDURE — 97535 SELF CARE MNGMENT TRAINING: CPT

## 2023-12-05 NOTE — PROGRESS NOTES
See evaluation in POC for goals and assessment     Eval Date: 12/12/2023    Verónica Meza, PT, DPT, CLT

## 2023-12-12 PROBLEM — M79.89 LEG SWELLING: Status: ACTIVE | Noted: 2023-12-12

## 2023-12-12 PROBLEM — I87.2 VENOUS INSUFFICIENCY: Status: ACTIVE | Noted: 2023-12-12

## 2023-12-12 PROBLEM — I89.0 LYMPHEDEMA: Status: ACTIVE | Noted: 2023-12-12

## 2023-12-12 NOTE — PLAN OF CARE
[facility]  Physical Therapy Initial Evaluation    Name: Estrella De Oliveira  Clinic Number: [id number]    Therapy Diagnosis:   Encounter Diagnoses   Name Primary?    Lymphedema     Venous insufficiency     Leg swelling Yes     Physician: Broderick Canales MD    Physician Orders: PT Eval and Treat - lymphedema  Medical Diagnosis from Referral: I89.0 (ICD-10-CM) - Lymphedema, not elsewhere classified I87.2 (ICD-10-CM) - Venous insufficiency (chronic) (peripheral)  Evaluation Date: 12/5/2023  Authorization Period Expiration: 1/30/2023  Plan of Care Expiration: 1/18/2023  Visit # / Visits authorized: 1/ 1    Time In: 1:00am  Time Out: 2:00pm  Total Billable Time: 60 minutes    Precautions: Standard and Diabetes    Subjective   Date of onset: a few years   History of current condition - Estrella Hall reports: The swelling has been going on for years. She has gone to the hospital several times for her legs. She recently had a tumor in her eye and since taking steroids, the swelling has been reducing      Pt denies CHF, KF, DM and CA.   Pt reports DM 2, she has a tumor in her eye, unknown status   Fluid pill: isn't taking it now   Blood thinner: No     Medical History:   Past Medical History:   Diagnosis Date    Essential (primary) hypertension     Legally blind in left eye, as defined in [place]     Type 2 diabetes mellitus with unspecified diabetic retinopathy without macular edema        Surgical History:   Estrella De Oliveira  has a past surgical history that includes Gallbladder surgery; revision of total knee replacement ; and pr eval,swallow function,cine/video record (8/26/2021).    Medications:   Estrella Hall has a current medication list which includes the following prescription(s): albuterol, albuterol, amlodipine, atorvastatin, azelastine, blood sugar diagnostic, blood-glucose meter, cefdinir, cephalexin, cetirizine, cyclobenzaprine, diclofenac sodium, diphenoxylate-atropine 2.5-0.025 mg,  enoxaparin, esomeprazole, esomeprazole, furosemide, gabapentin, hydrochlorothiazide, hydrocodone bitartrate, hydrocortisone, lancets, levocetirizine, lisinopril, meclizine, meloxicam, metformin, metoprolol succinate, montelukast, ondansetron, oxybutynin, oxycodone, oxycodone-acetaminophen, sertraline, sulfacetamide-prednisolone 10%-0.23% (0.25%), and belsomra.    Allergies:   Review of patient's allergies indicates:   Allergen Reactions    Adhesive      Other reaction(s): BLISTERS    Latex      Added based on information entered during log entry, please review and add reactions, type, and severity as needed    Pramipexole Itching     Patient states it caused her to have severe itching    Silicone Itching    Silicones      Other reaction(s): BLISTERS    Adhesive tape-silicones Rash        Imaging,     Impression:     There is no evidence of hemodynamically significant venous reflux in either right or left greater or lesser saphenous vein.      Surgery: B TKAs   Radiation:  none  Chemotherapy: none   Previous Lymphedema Treatment: none   Prior Therapy: ortho PT   Social History: no stairs    Environmental barriers: lives alone    Abuse/Neglect: Pt shows no visible signs of abuse/neglect   Nutritional status: Pt reports no nutritional needs    Educational needs: Pt reports no educational needs    Spiritual/Cultural: Pt reports no spiritual/ cultural needs     Fall risk: in August, needed EMS, not injured    Occupation: disabled, was driving for [employer]   Prior Level of Function: independent   Current Level of Function: shoes, dressing  Gait: uses a cane and walker, didn't bring anything today    Transfers:independent    Bed Mobility: independent      Pain and Swelling:    Current 0/10, worst 9/10, best 0/10   Location: LLE   Description: tight   Aggravating Factors: standing too long   Easing Factors: nothing    Pts goals: get the swelling out     Objective     Pt reports to clinic with no AD  Amount of  Swelling/Location of Swelling: moderate swelling of BLEs, more affected on the LLE  Skin Integrity: intact, no wounds    Palpation/Texture: pitting edema of LLE   + L Stemmer Sign  - B Damion's Sign  Circulation: intact      Posture: FHP     Strength: functional screen of AROM against gravity and sit to stand transfers       Sensation:  intact to lt touch B LE    Girth Measurements (in centimeters)              CMS Impairment/Limitation/Restriction for FOTO LE edema Survey  Limitation: 53%    Therapist reviewed FOTO scores for Estrella De Oliveira on 12/5/2023.   FOTO documents entered into [employer] - see Media section.       TREATMENT     Total Treatment time separate from Evaluation: 30 minutes    Self Care/Home Management training for 30 minutes including:    Pt was educated in potential compression needs.  Demo of products including socks, garments, and Inelastic Velcro wraps.   Discussed cost/coverage and authorization per insurance with Durable Medical Equipment(DME) provider.  Compression require orders from referring provider and coverage or purchase of products from DME or self order.      Discussed wear schedule, don/doff, wash and management of products.  Size and compression class and AM/PM needs.    Consideration for tubigrip as form of temporary compression use until securing compression needs.   Consideration for shorts/tights or capris style compression to compliment lower leg compression to support size/shape of LE.       Informed insurance coverage of compression is per DME provider and typically Medicare and Medicare group plans may not cover cost beyond pair of standard sized knee high garments.   Commitment to attendance as well as commitment to securing compression needs is critical to edema management.      Home Exercises and Patient Education Provided  Education provided:   - lymph booklet   - Pt was educated in lymphedema etiology and management plans.  Pt was provided with written risk reductions  and precautions for managing lymphedema.      This patient is in agreement to participate in Lymphedema treatment.    Written Home Exercises Provided: yes.  Exercises were reviewed and Estrella Hall was able to demonstrate them prior to the end of the session.  Estrella Hall demonstrated good  understanding of the education provided.     See EMR under Patient Instructions for exercises provided 12/5/2023.    Assessment   Estrella Hall is a 63 y.o. female referred to outpatient Physical Therapy with a medical diagnosis of I89.0 (ICD-10-CM) - Lymphedema, not elsewhere classified I87.2 (ICD-10-CM) - Venous insufficiency (chronic) (peripheral). This patient presents s/p   DM, HTN      resulting in: lymphedema of the BLEs, increased pain, as well as difficulty performing finding shoes and dressing  , compression needs, and placing the pt at higher risk of infection.     Pt presents with moderate swelling of the LLE and mild swelling of the RLE with pitting edema of the LLE. Pt's skin is intact. Pt was educated on lymphedema causes and physiology, infection signs and symptoms, complete decongestive therapy and its components, and expectations for therapy. Pt was also educated on the need for some type of compression.   Pt would benefit from therapy to decrease swelling, aid in finding compression, and preparing pt for home management     Pt prognosis is Good.   Pt will benefit from skilled outpatient Physical Therapy to address the deficits stated above and in the chart below, provide pt/family education, and to maximize pt's level of independence.     Plan of care discussed with patient: Yes  Pt's spiritual, cultural and educational needs considered and patient is agreeable to the plan of care and goals as stated below:     Medical Necessity is demonstrated by the following  History  Co-morbidities and personal factors that may impact the plan of care [] LOW: no personal factors / co-morbidities  [] MODERATE: 1-2 personal factors /  co-morbidities  [x] HIGH: 3+ personal factors / co-morbidities    Moderate / High Support Documentation: HTN, DM, BMI      Examination  Body Structures and Functions, activity limitations and participation restrictions that may impact the plan of care [] LOW: addressing 1-2 elements  [x] MODERATE: 3+ elements  [] HIGH: 4+ elements (please support below)    Moderate / High Support Documentation: leg swelling, leg pain, limitations with dressing, limitations with finding shoes      Clinical Presentation [] LOW: stable  [x] MODERATE: Evolving  [] HIGH: Unstable     Decision Making/ Complexity Score: moderate       Anticipated Barriers for therapy: none     The following goals were discussed with the patient and patient is in agreement with them as to be addressed in the treatment plan.     Short Term Goals: (6 weeks)  1. Patient will show decreased girth in L LE by up to 1 cm to allow for LE symmetry, shoe and clothing choice, and ability to apply needed compression.  (progressing, not met)   2. Patient will demonstrate 100% knowledge of lymphedema precautions and signs of infection to allow for reduced lymphedema risk, infection risk, and/or exacerbation of condition.  (progressing, not met)  3. Patient or caregiver will perform self-bandaging techniques and/or wearing of compression garments to allow for lymphatic drainage support, skin elasticity, and reduction in shape and size of limb. (progressing, not met)  4. Patient will perform self lymph drainage techniques to areas within reach to enhance lymphatic drainage and skin condition.  (progressing, not met)  5. Patient will tolerate daily activities with multilayered bandaging to allow for lymphatic and venous support.  (progressing, not met)    Long Term Goals: (12  weeks)  1. Patient will show decreased girth in L LE by up to 2 cm  to allow for LE symmetry, shoe and clothing choice, and ability to apply needed compression daily.  (progressing, not met)  2.  Patient will show reduction in density to mild or less with improved contour of limb to allow for cosmesis, LE symmetry, infection risk reduction, and clothing and compression choice.   (progressing, not met)  3. Patient to tal/doff compression garment with daily compliance to assist in lymphedema management, skin elasticity, and tissue density.  (progressing, not met)  4. Pt to show improved postural awareness and alignment.  (progressing, not met)  5. Pt to be I and compliant with HEP to allow for increased function in affected limb.   (progressing, not met)  Plan   Plan of care Certification: 12/5/2023 to 1/18/2023.    Outpatient Physical Therapy 2 times weekly for 6 weeks to include the following interventions: Gait Training, Manual Therapy, Neuromuscular Re-ed, Patient Education, Self Care, Therapeutic Activities, and Therapeutic Exercise. Complete Decongestive Therapy- compression and home equipment needs to be addressed and assisted.    Pt may be seen by a PTA as part of the Rehab treatment team.  Plan of Care was discussed with LESLEY Mercado, PT

## 2023-12-13 ENCOUNTER — CLINICAL SUPPORT (OUTPATIENT)
Dept: REHABILITATION | Facility: HOSPITAL | Age: 63
End: 2023-12-13
Payer: MEDICARE

## 2023-12-13 DIAGNOSIS — I89.0 LYMPHEDEMA: Primary | ICD-10-CM

## 2023-12-13 DIAGNOSIS — I87.2 VENOUS INSUFFICIENCY: ICD-10-CM

## 2023-12-13 DIAGNOSIS — M79.89 LEG SWELLING: ICD-10-CM

## 2023-12-13 PROCEDURE — 97140 MANUAL THERAPY 1/> REGIONS: CPT | Mod: CQ

## 2023-12-13 PROCEDURE — 97535 SELF CARE MNGMENT TRAINING: CPT | Mod: CQ

## 2023-12-13 NOTE — PROGRESS NOTES
Physical Therapy Daily Treatment Note     Name: Estrella De Oliveira  Clinic Number: [id number]    Therapy Diagnosis:   Encounter Diagnoses   Name Primary?    Lymphedema Yes    Venous insufficiency     Leg swelling      Physician: Broderick Canales MD    Visit Date: 12/13/2023    Physician Orders: PT Eval and Treat - lymphedema  Medical Diagnosis from Referral: I89.0 (ICD-10-CM) - Lymphedema, not elsewhere classified I87.2 (ICD-10-CM) - Venous insufficiency (chronic) (peripheral)  Evaluation Date: 12/5/2023  Authorization Period Expiration: 1/30/2023  Plan of Care Expiration: 1/18/2023  Visit # / Visits authorized: 1/ 23     Time In: 10:00 am   Time Out: 11:00 am  Total Billable Time: 60 minutes      Precautions: Standard and Diabetes    Subjective     Pt reports: her legs are usually pretty swollen and tight. They aren't as bad today compared to normal . She is on steroids she feels is helping the fluid.   She was compliant with home compression/exercise program.  Response to previous treatment: eval   Functional change: none    Pain: 0/10  Location: BLEs     Objective     Pt reports to clinic with no AD  Amount of Swelling/Location of Swelling: moderate swelling of BLEs, more affected on the LLE  Skin Integrity: intact, no wounds    Palpation/Texture: pitting edema of LLE   + L Stemmer Sign  - B Damion's Sign  Circulation: intact      Treatment:   Estrella Hall received the following manual therapy techniques:- Manual Lymphatic Drainage were applied to the: LLE for 50 minutes, including: MLD and short stretch compression bandaging     MANUAL LYMPHATIC DRAINAGE (MLD):    While supine with LEs elevated stimulation at terminus, along GI region, B inguinal regions, drainage of entire L LE eliezer lower leg, ankle, and foot with return proximally,  Use of Aquaphor/Eucerin due to dryness.   Consider self massage to abdominal areas, B inguinal areas, thigh, and remaining LE within reach.    MULTILAYERED BANDAGING:  issued  supplies and bandaged L LE with cotton stockinette, Rosidal soft section dorsum of foot,1 cellona cotton roll foot to mid calf , 1 Rosidal soft rolls ankle to knee, 1-8cm and 2- 10cm Rosidal K rolls foot to knee, to leave intact 12-24 hrs as tolerated, discontinue with any problems, return rolled bandages next session. Wash and wear schedules confirmed.     Estrella Hall received therapeutic exercises to develop strength, endurance, and ROM for 5 minutes including:  aps, walking, avoid dependency and immobility, support of muscle pump with compression and activity.  THERAPEUTIC EXERCISES:  Continue HEP of AROM, stretching, and postural correction.     Home Exercises Provided and Patient Education Provided:  Self Care Home Management Training/Functional Therapeutic Activity x 10 minutes    Reviewed needs for compression   Demo of inelastic velcro wraps   Demo of 20-30mmHg compression sock/garment     Present compression:  none  Orders and recommendations: inelastic velcro wrap or 20-30mmHG sock/garment   PATIENT/FAMILY Education: bandaging/compression wear schedule,  HEP,  Beginning of self massage,  Self or assisted bandaging, compression options, and Risk reduction    Written Home Exercises Provided: yes.  Home exercise and compression plan of management was reviewed and Estrella Hall was able to demonstrate understanding prior to the end of the session.  Estrella Hall demonstrated good  understanding of the education provided.     Assessment     Pt presents with increased swelling L>RLE . Initiated treatment to LLE today and education on needs for compression daily for management. Discussed may need to consider velcro wraps due to ankle bulge may not allow for appropriate fit in sock or garment. Will monitor response and continue to progress.       Estrella Hall Is progressing well towards her goals.   Pt prognosis is Good.     Pt will continue to benefit from skilled outpatient physical therapy to address the deficits listed in  the problem list box on initial evaluation, provide pt/family education and to maximize pt's level of independence in the home and community environment.   Pt's spiritual, cultural and educational needs considered and pt agreeable to plan of care and goals.     Anticipated barriers to physical therapy: none    Short Term Goals: (6 weeks)  1. Patient will show decreased girth in L LE by up to 1 cm to allow for LE symmetry, shoe and clothing choice, and ability to apply needed compression.  (progressing, not met)   2. Patient will demonstrate 100% knowledge of lymphedema precautions and signs of infection to allow for reduced lymphedema risk, infection risk, and/or exacerbation of condition.  (progressing, not met)  3. Patient or caregiver will perform self-bandaging techniques and/or wearing of compression garments to allow for lymphatic drainage support, skin elasticity, and reduction in shape and size of limb. (progressing, not met)  4. Patient will perform self lymph drainage techniques to areas within reach to enhance lymphatic drainage and skin condition.  (progressing, not met)  5. Patient will tolerate daily activities with multilayered bandaging to allow for lymphatic and venous support.  (progressing, not met)     Long Term Goals: (12  weeks)  1. Patient will show decreased girth in L LE by up to 2 cm  to allow for LE symmetry, shoe and clothing choice, and ability to apply needed compression daily.  (progressing, not met)  2. Patient will show reduction in density to mild or less with improved contour of limb to allow for cosmesis, LE symmetry, infection risk reduction, and clothing and compression choice.   (progressing, not met)  3. Patient to tal/doff compression garment with daily compliance to assist in lymphedema management, skin elasticity, and tissue density.  (progressing, not met)  4. Pt to show improved postural awareness and alignment.  (progressing, not met)  5. Pt to be I and compliant with  HEP to allow for increased function in affected limb.   (progressing, not met)  Plan   Plan of care Certification: 12/5/2023 to 1/18/2023.     Outpatient Physical Therapy 2 times weekly for 6 weeks to include the following interventions: Gait Training, Manual Therapy, Neuromuscular Re-ed, Patient Education, Self Care, Therapeutic Activities, and Therapeutic Exercise. Complete Decongestive Therapy- compression and home equipment needs to be addressed and assisted.    Leslie Hammonds, PTA

## 2023-12-18 ENCOUNTER — CLINICAL SUPPORT (OUTPATIENT)
Dept: REHABILITATION | Facility: HOSPITAL | Age: 63
End: 2023-12-18
Payer: MEDICARE

## 2023-12-18 DIAGNOSIS — M79.89 LEG SWELLING: ICD-10-CM

## 2023-12-18 DIAGNOSIS — I87.2 VENOUS INSUFFICIENCY: ICD-10-CM

## 2023-12-18 DIAGNOSIS — I89.0 LYMPHEDEMA: Primary | ICD-10-CM

## 2023-12-18 PROCEDURE — 97140 MANUAL THERAPY 1/> REGIONS: CPT

## 2023-12-18 PROCEDURE — 97016 VASOPNEUMATIC DEVICE THERAPY: CPT

## 2023-12-18 PROCEDURE — 29581 APPL MULTLAYER CMPRN SYS LEG: CPT

## 2023-12-18 NOTE — PROGRESS NOTES
Physical Therapy Daily Treatment Note     Name: Estrella De Oliveira  Clinic Number: [id number]    Therapy Diagnosis:   No diagnosis found.    Physician: Broderick Canales MD    Visit Date: 12/18/2023    Physician Orders: PT Eval and Treat - lymphedema  Medical Diagnosis from Referral: I89.0 (ICD-10-CM) - Lymphedema, not elsewhere classified I87.2 (ICD-10-CM) - Venous insufficiency (chronic) (peripheral)  Evaluation Date: 12/5/2023  Authorization Period Expiration: 1/30/2023  Plan of Care Expiration: 1/18/2023  Visit # / Visits authorized: 3/ 23     Time In: 1:19am  Time Out: 2:15pm  Total Billable Time: 56 minutes      Precautions: Standard and Diabetes    Subjective     Pt reports: The foot part of her bandages feel off but she kept them on. She saw a difference when she took them off.   She was compliant with home compression/exercise program.  Response to previous treatment: eval   Functional change: none    Pain: 0/10  Location: BLEs     Objective     Pt reports to clinic with no AD  Amount of Swelling/Location of Swelling: moderate swelling of BLEs, more affected on the LLE  Skin Integrity: intact, no wounds    Palpation/Texture: pitting edema of LLE   + L Stemmer Sign  - B Damion's Sign  Circulation: intact      Treatment:   Estrella Hall received the following manual therapy techniques:- Manual Lymphatic Drainage were applied to the: LLE for 56 minutes, including: MLD and short stretch compression bandaging     MANUAL LYMPHATIC DRAINAGE (MLD):    While supine with LEs elevated stimulation at terminus, along GI region, B inguinal regions, drainage of entire L LE eliezer lower leg, ankle, and foot with return proximally,  Use of Aquaphor/Eucerin due to dryness.   Consider self massage to abdominal areas, B inguinal areas, thigh, and remaining LE within reach.    SEQUENTIAL COMPRESSION PUMP: full leg sleeve applied to L LE  VES 5200 with default setting with distal pressures starting at 45mmHg entire LE x 20  minutes    MULTILAYERED BANDAGING:  issued supplies and bandaged L LE with cotton stockinette, Rosidal soft section dorsum of foot,1 cellona cotton roll foot to mid calf , 1 Rosidal soft rolls ankle to knee, 1-8cm and 2- 10cm Rosidal K rolls foot to knee, to leave intact 12-24 hrs as tolerated, discontinue with any problems, return rolled bandages next session. Wash and wear schedules confirmed.   komprex to anterior lower leg to address persistent swelling    Estrella Hall received therapeutic exercises to develop strength, endurance, and ROM for 5 minutes including:  aps, walking, avoid dependency and immobility, support of muscle pump with compression and activity.  THERAPEUTIC EXERCISES:  Continue HEP of AROM, stretching, and postural correction.     Home Exercises Provided and Patient Education Provided:  Self Care Home Management Training/Functional Therapeutic Activity x 10 minutes    Reviewed needs for compression   Demo of inelastic velcro wraps   Demo of 20-30mmHg compression sock/garment     Present compression:  none  Orders and recommendations: inelastic velcro wrap or 20-30mmHG sock/garment   PATIENT/FAMILY Education: bandaging/compression wear schedule,  HEP,  Beginning of self massage,  Self or assisted bandaging, compression options, and Risk reduction    Written Home Exercises Provided: yes.  Home exercise and compression plan of management was reviewed and Estrella Hall was able to demonstrate understanding prior to the end of the session.  Estrella Hall demonstrated good  understanding of the education provided.     Assessment     Pt presents with increased swelling L>RLE . Pt tolerated massage, bandage, and pump of the LLE well with no reports of pain.  Added a piece of rounded komprex to anterior lower leg to address persistent swelling. Will continue to progress       Estrella Hall Is progressing well towards her goals.   Pt prognosis is Good.     Pt will continue to benefit from skilled outpatient physical  therapy to address the deficits listed in the problem list box on initial evaluation, provide pt/family education and to maximize pt's level of independence in the home and community environment.   Pt's spiritual, cultural and educational needs considered and pt agreeable to plan of care and goals.     Anticipated barriers to physical therapy: none    Short Term Goals: (6 weeks)  1. Patient will show decreased girth in L LE by up to 1 cm to allow for LE symmetry, shoe and clothing choice, and ability to apply needed compression.  (progressing, not met)   2. Patient will demonstrate 100% knowledge of lymphedema precautions and signs of infection to allow for reduced lymphedema risk, infection risk, and/or exacerbation of condition.  (progressing, not met)  3. Patient or caregiver will perform self-bandaging techniques and/or wearing of compression garments to allow for lymphatic drainage support, skin elasticity, and reduction in shape and size of limb. (progressing, not met)  4. Patient will perform self lymph drainage techniques to areas within reach to enhance lymphatic drainage and skin condition.  (progressing, not met)  5. Patient will tolerate daily activities with multilayered bandaging to allow for lymphatic and venous support.  (progressing, not met)     Long Term Goals: (12  weeks)  1. Patient will show decreased girth in L LE by up to 2 cm  to allow for LE symmetry, shoe and clothing choice, and ability to apply needed compression daily.  (progressing, not met)  2. Patient will show reduction in density to mild or less with improved contour of limb to allow for cosmesis, LE symmetry, infection risk reduction, and clothing and compression choice.   (progressing, not met)  3. Patient to tal/doff compression garment with daily compliance to assist in lymphedema management, skin elasticity, and tissue density.  (progressing, not met)  4. Pt to show improved postural awareness and alignment.  (progressing,  not met)  5. Pt to be I and compliant with HEP to allow for increased function in affected limb.   (progressing, not met)  Plan   Plan of care Certification: 12/5/2023 to 1/18/2023.     Outpatient Physical Therapy 2 times weekly for 6 weeks to include the following interventions: Gait Training, Manual Therapy, Neuromuscular Re-ed, Patient Education, Self Care, Therapeutic Activities, and Therapeutic Exercise. Complete Decongestive Therapy- compression and home equipment needs to be addressed and assisted.    Verónica Meza, PT

## 2023-12-20 ENCOUNTER — CLINICAL SUPPORT (OUTPATIENT)
Dept: REHABILITATION | Facility: HOSPITAL | Age: 63
End: 2023-12-20
Payer: MEDICAID

## 2023-12-20 DIAGNOSIS — I87.2 VENOUS INSUFFICIENCY: ICD-10-CM

## 2023-12-20 DIAGNOSIS — I89.0 LYMPHEDEMA: Primary | ICD-10-CM

## 2023-12-20 DIAGNOSIS — M79.89 LEG SWELLING: ICD-10-CM

## 2023-12-20 PROCEDURE — 97016 VASOPNEUMATIC DEVICE THERAPY: CPT

## 2023-12-20 PROCEDURE — 97140 MANUAL THERAPY 1/> REGIONS: CPT

## 2023-12-20 PROCEDURE — 29581 APPL MULTLAYER CMPRN SYS LEG: CPT | Mod: LT

## 2023-12-22 DIAGNOSIS — I87.2 VENOUS INSUFFICIENCY: Primary | ICD-10-CM

## 2023-12-22 DIAGNOSIS — I89.0 LYMPHEDEMA: ICD-10-CM

## 2023-12-22 NOTE — PROGRESS NOTES
Physical Therapy Daily Treatment Note     Name: Estrella De Oliveira  Clinic Number: [id number]    Therapy Diagnosis:   Encounter Diagnoses   Name Primary?    Lymphedema Yes    Venous insufficiency     Leg swelling        Physician: Broderick Canales MD    Visit Date: 12/20/2023    Physician Orders: PT Eval and Treat - lymphedema  Medical Diagnosis from Referral: I89.0 (ICD-10-CM) - Lymphedema, not elsewhere classified I87.2 (ICD-10-CM) - Venous insufficiency (chronic) (peripheral)  Evaluation Date: 12/5/2023  Authorization Period Expiration: 12/29/2023  Plan of Care Expiration: 1/18/2023  Visit # / Visits authorized: 3/ 23     Time In: 12:33pm  Time Out: 1:30pm  Total Billable Time: 57 minutes      Precautions: Standard and Diabetes    Subjective     Pt reports: She has been doing ok   She was compliant with home compression/exercise program.  Response to previous treatment: eval   Functional change: none    Pain: 0/10  Location: BLEs     Objective     Pt reports to clinic with no AD  Amount of Swelling/Location of Swelling: moderate swelling of BLEs, more affected on the LLE  Skin Integrity: intact, no wounds    Palpation/Texture: pitting edema of LLE   + L Stemmer Sign  - B Damion's Sign  Circulation: intact      Treatment:   Estrella Hall received the following manual therapy techniques:- Manual Lymphatic Drainage were applied to the: LLE for 57 minutes, including: MLD and short stretch compression bandaging     MANUAL LYMPHATIC DRAINAGE (MLD):    While supine with LEs elevated stimulation at terminus, along GI region, B inguinal regions, drainage of entire L LE eliezer lower leg, ankle, and foot with return proximally,  Use of Aquaphor/Eucerin due to dryness.   Consider self massage to abdominal areas, B inguinal areas, thigh, and remaining LE within reach.    SEQUENTIAL COMPRESSION PUMP: full leg sleeve applied to L LE  VES 5200 with default setting with distal pressures starting at 45mmHg entire LE x 20  minutes    MULTILAYERED BANDAGING:  issued supplies and bandaged L LE with cotton stockinette, Rosidal soft section dorsum of foot,1 cellona cotton roll foot to mid calf , 1 Rosidal soft rolls ankle to knee, 1-8cm and 2- 10cm Rosidal K rolls foot to knee, to leave intact 12-24 hrs as tolerated, discontinue with any problems, return rolled bandages next session. Wash and wear schedules confirmed.   komprex to anterior lower leg to address persistent swelling    Estrella Hall received therapeutic exercises to develop strength, endurance, and ROM for 5 minutes including:  aps, walking, avoid dependency and immobility, support of muscle pump with compression and activity.  THERAPEUTIC EXERCISES:  Continue HEP of AROM, stretching, and postural correction.     Home Exercises Provided and Patient Education Provided:  Self Care Home Management Training/Functional Therapeutic Activity x 10 minutes    Reviewed needs for compression   Demo of inelastic velcro wraps   Demo of 20-30mmHg compression sock/garment     Present compression:  none  Orders and recommendations: inelastic velcro wrap or 20-30mmHG sock/garment   PATIENT/FAMILY Education: bandaging/compression wear schedule,  HEP,  Beginning of self massage,  Self or assisted bandaging, compression options, and Risk reduction    Written Home Exercises Provided: yes.  Home exercise and compression plan of management was reviewed and Estrella Hall was able to demonstrate understanding prior to the end of the session.  Estrella Hall demonstrated good  understanding of the education provided.     Assessment     Pt presents with increased swelling L>RLE . Focus continues to be on LLE as it is the more affected. Pt's LLE was massaged, pump, and bandaged. Piece of komprex was added to anterior shin to address area of persistent well. Will continue to progress       Estrella Hall Is progressing well towards her goals.   Pt prognosis is Good.     Pt will continue to benefit from skilled outpatient  physical therapy to address the deficits listed in the problem list box on initial evaluation, provide pt/family education and to maximize pt's level of independence in the home and community environment.   Pt's spiritual, cultural and educational needs considered and pt agreeable to plan of care and goals.     Anticipated barriers to physical therapy: none    Short Term Goals: (6 weeks)  1. Patient will show decreased girth in L LE by up to 1 cm to allow for LE symmetry, shoe and clothing choice, and ability to apply needed compression.  (progressing, not met)   2. Patient will demonstrate 100% knowledge of lymphedema precautions and signs of infection to allow for reduced lymphedema risk, infection risk, and/or exacerbation of condition.  (progressing, not met)  3. Patient or caregiver will perform self-bandaging techniques and/or wearing of compression garments to allow for lymphatic drainage support, skin elasticity, and reduction in shape and size of limb. (progressing, not met)  4. Patient will perform self lymph drainage techniques to areas within reach to enhance lymphatic drainage and skin condition.  (progressing, not met)  5. Patient will tolerate daily activities with multilayered bandaging to allow for lymphatic and venous support.  (progressing, not met)     Long Term Goals: (12  weeks)  1. Patient will show decreased girth in L LE by up to 2 cm  to allow for LE symmetry, shoe and clothing choice, and ability to apply needed compression daily.  (progressing, not met)  2. Patient will show reduction in density to mild or less with improved contour of limb to allow for cosmesis, LE symmetry, infection risk reduction, and clothing and compression choice.   (progressing, not met)  3. Patient to tal/doff compression garment with daily compliance to assist in lymphedema management, skin elasticity, and tissue density.  (progressing, not met)  4. Pt to show improved postural awareness and alignment.   (progressing, not met)  5. Pt to be I and compliant with HEP to allow for increased function in affected limb.   (progressing, not met)  Plan   Plan of care Certification: 12/5/2023 to 1/18/2023.     Outpatient Physical Therapy 2 times weekly for 6 weeks to include the following interventions: Gait Training, Manual Therapy, Neuromuscular Re-ed, Patient Education, Self Care, Therapeutic Activities, and Therapeutic Exercise. Complete Decongestive Therapy- compression and home equipment needs to be addressed and assisted.    Verónica Meza, PT

## 2024-01-11 ENCOUNTER — CLINICAL SUPPORT (OUTPATIENT)
Dept: REHABILITATION | Facility: HOSPITAL | Age: 64
End: 2024-01-11
Payer: MEDICARE

## 2024-01-11 DIAGNOSIS — M79.89 LEG SWELLING: ICD-10-CM

## 2024-01-11 DIAGNOSIS — I87.2 VENOUS INSUFFICIENCY: ICD-10-CM

## 2024-01-11 DIAGNOSIS — I89.0 LYMPHEDEMA: Primary | ICD-10-CM

## 2024-01-11 PROCEDURE — 97016 VASOPNEUMATIC DEVICE THERAPY: CPT

## 2024-01-11 PROCEDURE — 29581 APPL MULTLAYER CMPRN SYS LEG: CPT

## 2024-01-11 PROCEDURE — 97140 MANUAL THERAPY 1/> REGIONS: CPT

## 2024-01-11 NOTE — PROGRESS NOTES
Physical Therapy Daily Treatment Note/ Progress Note      Name: Estrella De Oliveira  Clinic Number: [id number]    Therapy Diagnosis:   Encounter Diagnoses   Name Primary?    Lymphedema Yes    Venous insufficiency     Leg swelling          Physician: Broderick Canales MD    Visit Date: 1/11/2024    Physician Orders: PT Eval and Treat - lymphedema  Medical Diagnosis from Referral: I89.0 (ICD-10-CM) - Lymphedema, not elsewhere classified I87.2 (ICD-10-CM) - Venous insufficiency (chronic) (peripheral)  Evaluation Date: 12/5/2023  Authorization Period Expiration: 12/29/2023  Plan of Care Expiration: 1/18/2023  Visit # / Visits authorized: 1/ 23     Time In: 11:00am  Time Out: 12:00pm   Total Billable Time: 60 minutes      Precautions: Standard and Diabetes    Subjective     Pt reports: She has been very swollen, she was on her feet a lot for the holidays    She was compliant with home compression/exercise program.  Response to previous treatment: eval   Functional change: none    Pain: 0/10  Location: BLEs     Objective     Pt reports to clinic with no AD  Amount of Swelling/Location of Swelling: moderate swelling of BLEs, more affected on the LLE  Skin Integrity: intact, no wounds    Palpation/Texture: pitting edema of LLE   + L Stemmer Sign  - B Damion's Sign  Circulation: intact              Treatment:   Estrella Hall received the following manual therapy techniques:- Manual Lymphatic Drainage were applied to the: LLE for 60 minutes, including: MLD and short stretch compression bandaging     MANUAL LYMPHATIC DRAINAGE (MLD):    Not performed today due to time     SEQUENTIAL COMPRESSION PUMP: full leg sleeve applied to L LE  VES 5200 with default setting with distal pressures starting at 45mmHg entire LE x 20 minutes    MULTILAYERED BANDAGING:  issued supplies and bandaged L LE with cotton stockinette, Rosidal soft section dorsum of foot,1 cellona cotton roll foot to mid calf , 1 Rosidal soft rolls ankle to knee, 1-8cm and  2- 10cm Rosidal K rolls foot to knee, to leave intact 12-24 hrs as tolerated, discontinue with any problems, return rolled bandages next session. Wash and wear schedules confirmed.   komprex to anterior lower leg to address persistent swelling      Issued Comperm LF issued to pt for compression option after removing bandaging     Estrella Hall received therapeutic exercises to develop strength, endurance, and ROM for 5 minutes including:  aps, walking, avoid dependency and immobility, support of muscle pump with compression and activity.  THERAPEUTIC EXERCISES:  Continue HEP of AROM, stretching, and postural correction.     Home Exercises Provided and Patient Education Provided:  Self Care Home Management Training/Functional Therapeutic Activity x 10 minutes    Reviewed needs for compression   Demo of inelastic velcro wraps   Demo of 20-30mmHg compression sock/garment     Present compression:  none  Orders and recommendations: inelastic velcro wrap or 20-30mmHG sock/garment   PATIENT/FAMILY Education: bandaging/compression wear schedule,  HEP,  Beginning of self massage,  Self or assisted bandaging, compression options, and Risk reduction    Written Home Exercises Provided: yes.  Home exercise and compression plan of management was reviewed and Estrella Hall was able to demonstrate understanding prior to the end of the session.  Estrella Hall demonstrated good  understanding of the education provided.     Assessment     Measurements taken today show an increase in swelling since last visit. Pt reports discomfort with edemwear but says it was not the cause of the swelling and most likely was due to being on her feet during the holidays. Patient's swelling is not hot or painful or red. Pt was educated on importance of finding compression and compression stocking orders were printed for pt along with DME stores in the area. Pt's LLE was pumped and bandaged with no MLD due to time with measurements. Pt was given comperm LF for LLE  temporary compression       Estrella Hall Is progressing well towards her goals.   Pt prognosis is Good.     Pt will continue to benefit from skilled outpatient physical therapy to address the deficits listed in the problem list box on initial evaluation, provide pt/family education and to maximize pt's level of independence in the home and community environment.   Pt's spiritual, cultural and educational needs considered and pt agreeable to plan of care and goals.     Anticipated barriers to physical therapy: none    Short Term Goals: (6 weeks)  1. Patient will show decreased girth in L LE by up to 1 cm to allow for LE symmetry, shoe and clothing choice, and ability to apply needed compression.  (progressing, not met)   2. Patient will demonstrate 100% knowledge of lymphedema precautions and signs of infection to allow for reduced lymphedema risk, infection risk, and/or exacerbation of condition.  (progressing, not met)  3. Patient or caregiver will perform self-bandaging techniques and/or wearing of compression garments to allow for lymphatic drainage support, skin elasticity, and reduction in shape and size of limb. (progressing, not met)  4. Patient will perform self lymph drainage techniques to areas within reach to enhance lymphatic drainage and skin condition.  (progressing, not met)  5. Patient will tolerate daily activities with multilayered bandaging to allow for lymphatic and venous support.  (progressing, not met)     Long Term Goals: (12  weeks)  1. Patient will show decreased girth in L LE by up to 2 cm  to allow for LE symmetry, shoe and clothing choice, and ability to apply needed compression daily.  (progressing, not met)  2. Patient will show reduction in density to mild or less with improved contour of limb to allow for cosmesis, LE symmetry, infection risk reduction, and clothing and compression choice.   (progressing, not met)  3. Patient to tal/doff compression garment with daily compliance to  assist in lymphedema management, skin elasticity, and tissue density.  (progressing, not met)  4. Pt to show improved postural awareness and alignment.  (progressing, not met)  5. Pt to be I and compliant with HEP to allow for increased function in affected limb.   (progressing, not met)  Plan   Plan of care Certification: 12/5/2023 to 1/18/2023.     Outpatient Physical Therapy 2 times weekly for 6 weeks to include the following interventions: Gait Training, Manual Therapy, Neuromuscular Re-ed, Patient Education, Self Care, Therapeutic Activities, and Therapeutic Exercise. Complete Decongestive Therapy- compression and home equipment needs to be addressed and assisted.    Verónica Meza, PT

## 2024-02-07 ENCOUNTER — CLINICAL SUPPORT (OUTPATIENT)
Dept: REHABILITATION | Facility: HOSPITAL | Age: 64
End: 2024-02-07
Payer: MEDICARE

## 2024-02-07 DIAGNOSIS — I87.2 VENOUS INSUFFICIENCY: ICD-10-CM

## 2024-02-07 DIAGNOSIS — I89.0 LYMPHEDEMA: Primary | ICD-10-CM

## 2024-02-07 DIAGNOSIS — M79.89 LEG SWELLING: ICD-10-CM

## 2024-02-07 PROCEDURE — 29581 APPL MULTLAYER CMPRN SYS LEG: CPT

## 2024-02-07 PROCEDURE — 97016 VASOPNEUMATIC DEVICE THERAPY: CPT

## 2024-02-07 NOTE — PROGRESS NOTES
See evaluation in POC for goals and assessment     Eval Date: 02/22/2024    Verónica Meza, PT, DPT, CLT

## 2024-02-16 ENCOUNTER — TELEPHONE (OUTPATIENT)
Dept: OTOLARYNGOLOGY | Facility: CLINIC | Age: 64
End: 2024-02-16
Payer: MEDICAID

## 2024-02-19 ENCOUNTER — CLINICAL SUPPORT (OUTPATIENT)
Dept: AUDIOLOGY | Facility: CLINIC | Age: 64
End: 2024-02-19
Payer: MEDICARE

## 2024-02-19 ENCOUNTER — OFFICE VISIT (OUTPATIENT)
Dept: OTOLARYNGOLOGY | Facility: CLINIC | Age: 64
End: 2024-02-19
Payer: MEDICARE

## 2024-02-19 DIAGNOSIS — H65.22 CHRONIC SEROUS OTITIS MEDIA, LEFT EAR: Primary | ICD-10-CM

## 2024-02-19 DIAGNOSIS — H90.A32 MIXED CONDUCTIVE AND SENSORINEURAL HEARING LOSS OF LEFT EAR WITH RESTRICTED HEARING OF RIGHT EAR: Primary | ICD-10-CM

## 2024-02-19 DIAGNOSIS — H90.A32 MIXED CONDUCTIVE AND SENSORINEURAL HEARING LOSS OF LEFT EAR WITH RESTRICTED HEARING OF RIGHT EAR: ICD-10-CM

## 2024-02-19 DIAGNOSIS — H05.229 ORBITAL SWELLING: ICD-10-CM

## 2024-02-19 PROCEDURE — 92557 COMPREHENSIVE HEARING TEST: CPT | Mod: S$GLB,,,

## 2024-02-19 PROCEDURE — 92567 TYMPANOMETRY: CPT | Mod: S$GLB,,,

## 2024-02-19 PROCEDURE — 99999 PR PBB SHADOW E&M-EST. PATIENT-LVL IV: CPT | Mod: PBBFAC,,, | Performed by: OTOLARYNGOLOGY

## 2024-02-19 PROCEDURE — 99204 OFFICE O/P NEW MOD 45 MIN: CPT | Mod: 25,S$GLB,, | Performed by: OTOLARYNGOLOGY

## 2024-02-19 PROCEDURE — 92511 NASOPHARYNGOSCOPY: CPT | Mod: S$GLB,,, | Performed by: OTOLARYNGOLOGY

## 2024-02-19 PROCEDURE — 99999 PR PBB SHADOW E&M-EST. PATIENT-LVL I: CPT | Mod: PBBFAC,,,

## 2024-02-19 RX ORDER — PREDNISONE 10 MG/1
20 TABLET ORAL
COMMUNITY

## 2024-02-19 NOTE — PROGRESS NOTES
Estrella De Oliveira was seen today in the clinic for an audiologic evaluation.  Patient's main complaint was left sided aural pressure as well as a decrease in hearing on that side.  Ms. De Oliveira reported that she recently finished taking antibiotics for an ear infection on the left side .She denied any other otologic symptoms.     Tympanometry revealed Type A in the right ear and Type B tympanogram with a normal ear canal volume in the left ear.     Audiogram results revealed a mild sloping to moderately-severe sensorineural hearing loss in the right ear and a moderate sloping to profound mixed hearing loss in the left ear.      Speech reception thresholds were noted at 25 dB in the right ear and 65 dB in the left ear.    Speech discrimination scores were 100% in the right ear and 100% in the left ear.    Recommendations:  Otologic evaluation  Repeat audiogram following medical intervention or at ENT follow-up  Hearing protection when in noise

## 2024-02-19 NOTE — PROCEDURES
[id number] NASOPHARYNGOSCOPY    After application of Porter-Synephrine and lidocaine nasal spray(s), flexible bilateral nasopharyngoscopy was performed using the flexible video nasopharyngoscope.  The nose, nasopharynx and the Eustachian tube orifices were identified and inspectied on the left. There were no suspicious findings other than a minimal amount of dried blood on the left inferior turbinate.  No nasal mucosal mass in the middle meatus inferior meatus or sphenoethmoid recess with the normal-appearing nasopharynx and Eustachian tube orifice..  There was not bleeding or any other complication.
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
## 2024-02-19 NOTE — PATIENT INSTRUCTIONS
CT temporal bones without contrast.      Follow up next week to review the CT and make sure that the intraorbital tumor is not having some sort of skull base effect on the Eustachian tube and function of the ear.  Nothing suspicious seen on endoscopy today to explain that.      Next week we will plan on local anesthesia and tube placement in the left ear to hopefully drain the fluid completely and have the hearing on the left ear restored to that of the right.      Follow up with Ophthalmology as planned.  Seek emergency help if symptoms are worsening rather than stable.

## 2024-02-21 ENCOUNTER — CLINICAL SUPPORT (OUTPATIENT)
Dept: REHABILITATION | Facility: HOSPITAL | Age: 64
End: 2024-02-21
Payer: MEDICARE

## 2024-02-21 DIAGNOSIS — I87.2 VENOUS INSUFFICIENCY: ICD-10-CM

## 2024-02-21 DIAGNOSIS — M79.89 LEG SWELLING: ICD-10-CM

## 2024-02-21 DIAGNOSIS — I89.0 LYMPHEDEMA: Primary | ICD-10-CM

## 2024-02-21 PROCEDURE — 97016 VASOPNEUMATIC DEVICE THERAPY: CPT

## 2024-02-21 PROCEDURE — 29581 APPL MULTLAYER CMPRN SYS LEG: CPT

## 2024-02-21 PROCEDURE — 97140 MANUAL THERAPY 1/> REGIONS: CPT | Mod: 59

## 2024-02-21 NOTE — PROGRESS NOTES
Physical Therapy Daily Treatment Note     Name: Estrella De Oliveira  Clinic Number: [id number]    Therapy Diagnosis:   Encounter Diagnoses   Name Primary?    Lymphedema Yes    Venous insufficiency     Leg swelling      Physician: Broderick Canales MD    Visit Date: 2/21/2024    Physician Orders: PT Eval and Treat - lymphedema  Medical Diagnosis from Referral: I89.0 (ICD-10-CM) - Lymphedema, not elsewhere classified I87.2 (ICD-10-CM) - Venous insufficiency (chronic) (peripheral)  Evaluation Date: 12/5/2023  Authorization Period Expiration: 12/31/2024  Plan of Care Expiration: 3/18/2024  Visit # / Visits authorized: 3/ 20     Time In:1:30pm   Time Out: 2:30PM  Total Billable Time: 60 minutes      Precautions: Standard and Diabetes    Subjective     Pt reports: She has been doing ok, she is wearing the edemawear when not in bandages.  She was compliant with home exercise program.  Response to previous treatment: fine, no issues with bandages   Functional change: none so far     Pain: 0/10  Location: bilateral lower legs     Objective       Treatment:   Estrella Hall received the following manual therapy techniques:- Manual Lymphatic Drainage were applied to the: LLE for 60 minutes, including: MLD and short stretch compression bandaging       MANUAL LYMPHATIC DRAINAGE (MLD):    While supine with LEs elevated stimulation at terminus, along GI region, B inguinal regions, drainage of entire L LE eliezer lower leg, ankle, and foot with return proximally,  Use of Aquaphor due to dryness.   Educated in self massage to abdominal areas, B inguinal areas, thigh, and remaining LE within reach.    SEQUENTIAL COMPRESSION PUMP: full leg sleeve applied to L LE  VES 5200 with default setting with distal pressures starting at 45mmHg entire LE x 15 minutes    MULTILAYERED BANDAGING:  issued supplies and bandaged L LE with cotton stockinette, komprex section dorsum of foot, 2 komprex wedges post malleoli, 2 komprex rolls ankle to knee, 1-8cm  and 2- 10cm Durelast rolls foot to knee, to leave intact 12-24 hrs as tolerated, discontinue with any problems, return rolled bandages next session. Wash and wear schedules confirmed.   Komprex section added to anterior leg       Home Exercises Provided and Patient Education Provided     Education provided:    Remove bandages if painful, wear compression daily   PATIENT/FAMILY Education: bandaging wear schedule,  HEP,  Beginning of self massage,  Self or assisted bandaging, compression options, and Risk reduction    Written Home Exercises Provided: Patient instructed to cont prior HEP.  Exercises were reviewed and Estrella Hall was able to demonstrate them prior to the end of the session.  Estrella Hall demonstrated good  understanding of the education provided.       Assessment     Pt tolerated treatment well with no reports of pain     Pt presents with moderate edema secondary to unknown cause    Vaso pneumatic compression pump was used on pt's LLE     Pt's LLE was massaged to drain excess blood/ fluid. Pt was given info on steps and purpose of MLD.     Pt's LLE was bandaged using short stretch compression bandaging to apply graduated compression and aid in edema reduction. Pt was instructed to wear 12-24 hours and remove if painful. Pt was instructed on care of bandages and asked to bring bandages with them for next visit       Pt is complaint with use of compression in the form of edemawear size M    Pt would benefit from therapy and management of other health concerns to reduce edema, aid in finding compression and preparing for home management       Estrella Hall Is progressing well towards her goals.   Pt prognosis is Fair.     Pt will continue to benefit from skilled outpatient physical therapy to address the deficits listed in the problem list box on initial evaluation, provide pt/family education and to maximize pt's level of independence in the home and community environment.     Pt's spiritual, cultural and  educational needs considered and pt agreeable to plan of care and goals.     Anticipated barriers to physical therapy: transportation     Goals:     Short Term Goals: (6 weeks)  1. Patient will show decreased girth in L LE by up to 1 cm to allow for LE symmetry, shoe and clothing choice, and ability to apply needed compression.  (progressing, not met)   2. Patient will demonstrate 100% knowledge of lymphedema precautions and signs of infection to allow for reduced lymphedema risk, infection risk, and/or exacerbation of condition.  (progressing, not met)  3. Patient or caregiver will perform self-bandaging techniques and/or wearing of compression garments to allow for lymphatic drainage support, skin elasticity, and reduction in shape and size of limb. (progressing, not met)  4. Patient will perform self lymph drainage techniques to areas within reach to enhance lymphatic drainage and skin condition.  (progressing, not met)  5. Patient will tolerate daily activities with multilayered bandaging to allow for lymphatic and venous support.  (progressing, not met)     Long Term Goals: (12  weeks)  1. Patient will show decreased girth in L LE by up to 2 cm  to allow for LE symmetry, shoe and clothing choice, and ability to apply needed compression daily.  (progressing, not met)  2. Patient will show reduction in density to mild or less with improved contour of limb to allow for cosmesis, LE symmetry, infection risk reduction, and clothing and compression choice.   (progressing, not met)  3. Patient to tal/doff compression garment with daily compliance to assist in lymphedema management, skin elasticity, and tissue density.  (progressing, not met)  4. Pt to show improved postural awareness and alignment.  (progressing, not met)  5. Pt to be I and compliant with HEP to allow for increased function in affected limb.   (progressing, not met)      Plan   Continue PT  2x   weekly for Complete Decongestive Therapy:  Manual  lymphatic drainage, Multilayered short stretch bandaging, Pneumatic compression, Therapeutic exercises, Patient education as deemed necessary to achieve stated goals.      Verónica Meza, PT

## 2024-02-22 ENCOUNTER — PATIENT MESSAGE (OUTPATIENT)
Dept: OTOLARYNGOLOGY | Facility: CLINIC | Age: 64
End: 2024-02-22
Payer: MEDICAID

## 2024-02-22 ENCOUNTER — HOSPITAL ENCOUNTER (OUTPATIENT)
Dept: RADIOLOGY | Facility: HOSPITAL | Age: 64
Discharge: HOME | End: 2024-02-22
Attending: OTOLARYNGOLOGY
Payer: MEDICARE

## 2024-02-22 DIAGNOSIS — H90.A32 MIXED CONDUCTIVE AND SENSORINEURAL HEARING LOSS OF LEFT EAR WITH RESTRICTED HEARING OF RIGHT EAR: ICD-10-CM

## 2024-02-22 PROCEDURE — 70480 CT ORBIT/EAR/FOSSA W/O DYE: CPT | Mod: TC

## 2024-02-22 PROCEDURE — 70480 CT ORBIT/EAR/FOSSA W/O DYE: CPT | Mod: 26,,, | Performed by: STUDENT IN AN ORGANIZED HEALTH CARE EDUCATION/TRAINING PROGRAM

## 2024-02-22 NOTE — PLAN OF CARE
Physical Therapy Daily Treatment Note/ Plan of Care Update      Name: Estrella De Oliveira  Clinic Number: [id number]    Therapy Diagnosis:   Encounter Diagnoses   Name Primary?    Lymphedema Yes    Venous insufficiency     Leg swelling          Physician: Broderick Canales MD    Visit Date: 2/7/2024    Physician Orders: PT Eval and Treat - lymphedema  Medical Diagnosis from Referral: I89.0 (ICD-10-CM) - Lymphedema, not elsewhere classified I87.2 (ICD-10-CM) - Venous insufficiency (chronic) (peripheral)  Evaluation Date: 12/5/2023  Authorization Period Expiration: 12/29/2023  Plan of Care Expiration: 3/18/2024  Visit # / Visits authorized: 2/ 20     Time In:1:32PM  Time Out: 2:30PM  Total Billable Time: 58 minutes      Precautions: Standard and Diabetes    Subjective     Pt reports: has been having issues with her insurance but has resolved it   She was compliant with home compression/exercise program.  Response to previous treatment: eval   Functional change: none    Pain: 0/10  Location: BLEs     Objective     Pt reports to clinic with no AD  Amount of Swelling/Location of Swelling: moderate swelling of BLEs, more affected on the LLE  Skin Integrity: intact, no wounds    Palpation/Texture: pitting edema of LLE   + L Stemmer Sign  - B Damion's Sign  Circulation: intact                1/18/2024      Treatment:   Estrella Hall received the following manual therapy techniques:- Manual Lymphatic Drainage were applied to the: LLE for 58 minutes, including: MLD and short stretch compression bandaging     MANUAL LYMPHATIC DRAINAGE (MLD):    Not performed today due to time     SEQUENTIAL COMPRESSION PUMP: full leg sleeve applied to L LE  VES 5200 with default setting with distal pressures starting at 45mmHg entire LE x 20 minutes    MULTILAYERED BANDAGING:  issued supplies and bandaged L LE with cotton stockinette, Rosidal soft section dorsum of foot,1 cellona cotton roll foot to mid calf , 1 Rosidal soft rolls ankle to knee,  1-8cm and 2- 10cm Rosidal K rolls foot to knee, to leave intact 12-24 hrs as tolerated, discontinue with any problems, return rolled bandages next session. Wash and wear schedules confirmed.   komprex to anterior lower leg to address persistent swelling      Edemawear size M  issued to pt for compression option after removing bandaging     Estrella Hall received therapeutic exercises to develop strength, endurance, and ROM for 5 minutes including:  aps, walking, avoid dependency and immobility, support of muscle pump with compression and activity.  THERAPEUTIC EXERCISES:  Continue HEP of AROM, stretching, and postural correction.     Home Exercises Provided and Patient Education Provided:  Self Care Home Management Training/Functional Therapeutic Activity x 10 minutes    Reviewed needs for compression   Demo of inelastic velcro wraps   Demo of 20-30mmHg compression sock/garment     Present compression:  none  Orders and recommendations: inelastic velcro wrap or 20-30mmHG sock/garment   PATIENT/FAMILY Education: bandaging/compression wear schedule,  HEP,  Beginning of self massage,  Self or assisted bandaging, compression options, and Risk reduction    Written Home Exercises Provided: yes.  Home exercise and compression plan of management was reviewed and Estrella Hall was able to demonstrate understanding prior to the end of the session.  Estrella Hall demonstrated good  understanding of the education provided.     Assessment     Measurements taken today show an increase in swelling since last visit. Patient had lapse in treatment due to insurance issues but would like to continue to address swelling, particularly in the L lower leg. Patient's LLE was bandaged with no reports of pain from pt. Requesting extension to contiue trying to reduce patient's LE swelling and increase independence with home management.       Estrella Hall Is progressing well towards her goals.   Pt prognosis is Good.     Pt will continue to benefit from  skilled outpatient physical therapy to address the deficits listed in the problem list box on initial evaluation, provide pt/family education and to maximize pt's level of independence in the home and community environment.   Pt's spiritual, cultural and educational needs considered and pt agreeable to plan of care and goals.     Anticipated barriers to physical therapy: none    Short Term Goals: (6 weeks)  1. Patient will show decreased girth in L LE by up to 1 cm to allow for LE symmetry, shoe and clothing choice, and ability to apply needed compression.  (progressing, not met)   2. Patient will demonstrate 100% knowledge of lymphedema precautions and signs of infection to allow for reduced lymphedema risk, infection risk, and/or exacerbation of condition.  (progressing, not met)  3. Patient or caregiver will perform self-bandaging techniques and/or wearing of compression garments to allow for lymphatic drainage support, skin elasticity, and reduction in shape and size of limb. (progressing, not met)  4. Patient will perform self lymph drainage techniques to areas within reach to enhance lymphatic drainage and skin condition.  (progressing, not met)  5. Patient will tolerate daily activities with multilayered bandaging to allow for lymphatic and venous support.  (progressing, not met)     Long Term Goals: (12  weeks)  1. Patient will show decreased girth in L LE by up to 2 cm  to allow for LE symmetry, shoe and clothing choice, and ability to apply needed compression daily.  (progressing, not met)  2. Patient will show reduction in density to mild or less with improved contour of limb to allow for cosmesis, LE symmetry, infection risk reduction, and clothing and compression choice.   (progressing, not met)  3. Patient to tal/doff compression garment with daily compliance to assist in lymphedema management, skin elasticity, and tissue density.  (progressing, not met)  4. Pt to show improved postural awareness and  alignment.  (progressing, not met)  5. Pt to be I and compliant with HEP to allow for increased function in affected limb.   (progressing, not met)  Plan   Plan of care Certification: 12/5/2023 to 3/18/2024     Requesting extension in POC to continue to aid in reductions in edema.     Verónica Meza, PT

## 2024-02-23 ENCOUNTER — CLINICAL SUPPORT (OUTPATIENT)
Dept: REHABILITATION | Facility: HOSPITAL | Age: 64
End: 2024-02-23
Payer: MEDICAID

## 2024-02-23 DIAGNOSIS — I89.0 LYMPHEDEMA: Primary | ICD-10-CM

## 2024-02-23 DIAGNOSIS — I87.2 VENOUS INSUFFICIENCY: ICD-10-CM

## 2024-02-23 DIAGNOSIS — M79.89 LEG SWELLING: ICD-10-CM

## 2024-02-23 PROCEDURE — 97016 VASOPNEUMATIC DEVICE THERAPY: CPT | Mod: CQ

## 2024-02-23 PROCEDURE — 97140 MANUAL THERAPY 1/> REGIONS: CPT | Mod: CQ

## 2024-02-23 NOTE — TELEPHONE ENCOUNTER
Called pt with results as it appeared that pt had not logged into the Portal to view her message. Pt has appt Monday at 11 am for PET. Thanks, Kinsey

## 2024-02-23 NOTE — PROGRESS NOTES
Physical Therapy Daily Treatment Note     Name: Estrella De Oliveira  Clinic Number: [id number]    Therapy Diagnosis:   Encounter Diagnoses   Name Primary?    Lymphedema Yes    Venous insufficiency     Leg swelling        Physician: Broderick Canales MD    Visit Date: 2/23/2024    Physician Orders: PT Eval and Treat - lymphedema  Medical Diagnosis from Referral: I89.0 (ICD-10-CM) - Lymphedema, not elsewhere classified I87.2 (ICD-10-CM) - Venous insufficiency (chronic) (peripheral)  Evaluation Date: 12/5/2023  Authorization Period Expiration: 12/31/2024  Plan of Care Expiration: 3/18/2024  Visit # / Visits authorized: 4/ 20     Time In: 1:20 pm   Time Out: 2:20 pm  Total Billable Time: 60 minutes      Precautions: Standard and Diabetes    Subjective     Pt reports: her leg has been doing okay. She took the wrap off last night and looked like it went down some. She has been wearing the edemawear .   She was compliant with home exercise program.  Response to previous treatment: fine, no issues with bandages   Functional change: none so far     Pain: 0/10  Location: bilateral lower legs     Objective       Treatment:   Estrella Hall received the following manual therapy techniques:- Manual Lymphatic Drainage were applied to the: LLE for 45 minutes, including: MLD and short stretch compression bandaging       MANUAL LYMPHATIC DRAINAGE (MLD):    While supine with LEs elevated stimulation at terminus, along GI region, B inguinal regions, drainage of entire L LE eliezre lower leg, ankle, and foot with return proximally,  Use of Aquaphor due to dryness.   Educated in self massage to abdominal areas, B inguinal areas, thigh, and remaining LE within reach.    SEQUENTIAL COMPRESSION PUMP: full leg sleeve applied to L LE  VES 5200 with default setting with distal pressures starting at 45mmHg entire LE x 15 minutes    MULTILAYERED BANDAGING:  issued supplies and bandaged L LE with cotton stockinette, komprex section dorsum of foot, 2  komprex wedges post malleoli, 2 komprex rolls ankle to knee, 1-8cm and 2- 10cm Durelast rolls foot to knee, to leave intact 12-24 hrs as tolerated, discontinue with any problems, return rolled bandages next session. Wash and wear schedules confirmed.   Komprex section added to anterior leg       Home Exercises Provided and Patient Education Provided     Education provided:    Remove bandages if painful, wear compression daily   PATIENT/FAMILY Education: bandaging wear schedule,  HEP,  Beginning of self massage,  Self or assisted bandaging, compression options, and Risk reduction    Written Home Exercises Provided: Patient instructed to cont prior HEP.  Exercises were reviewed and Estrella Hall was able to demonstrate them prior to the end of the session.  Estrella Hall demonstrated good  understanding of the education provided.       Assessment     Pt tolerated treatment well with no reports of pain     Pt presents with moderate edema secondary to unknown cause    Vaso pneumatic compression pump was used on pt's LLE     Pt's LLE was massaged to drain excess blood/ fluid. Pt was given info on steps and purpose of MLD.     Pt's LLE was bandaged using short stretch compression bandaging to apply graduated compression and aid in edema reduction. Pt was instructed to wear 12-24 hours and remove if painful. Pt was instructed on care of bandages and asked to bring bandages with them for next visit       Pt is complaint with use of compression in the form of edemawear size M    Pt would benefit from therapy and management of other health concerns to reduce edema, aid in finding compression and preparing for home management       Estrella Hall Is progressing well towards her goals.   Pt prognosis is Fair.     Pt will continue to benefit from skilled outpatient physical therapy to address the deficits listed in the problem list box on initial evaluation, provide pt/family education and to maximize pt's level of independence in the home  and community environment.     Pt's spiritual, cultural and educational needs considered and pt agreeable to plan of care and goals.     Anticipated barriers to physical therapy: transportation     Goals:     Short Term Goals: (6 weeks)  1. Patient will show decreased girth in L LE by up to 1 cm to allow for LE symmetry, shoe and clothing choice, and ability to apply needed compression.  (progressing, not met)   2. Patient will demonstrate 100% knowledge of lymphedema precautions and signs of infection to allow for reduced lymphedema risk, infection risk, and/or exacerbation of condition.  (progressing, not met)  3. Patient or caregiver will perform self-bandaging techniques and/or wearing of compression garments to allow for lymphatic drainage support, skin elasticity, and reduction in shape and size of limb. (progressing, not met)  4. Patient will perform self lymph drainage techniques to areas within reach to enhance lymphatic drainage and skin condition.  (progressing, not met)  5. Patient will tolerate daily activities with multilayered bandaging to allow for lymphatic and venous support.  (progressing, not met)     Long Term Goals: (12  weeks)  1. Patient will show decreased girth in L LE by up to 2 cm  to allow for LE symmetry, shoe and clothing choice, and ability to apply needed compression daily.  (progressing, not met)  2. Patient will show reduction in density to mild or less with improved contour of limb to allow for cosmesis, LE symmetry, infection risk reduction, and clothing and compression choice.   (progressing, not met)  3. Patient to tal/doff compression garment with daily compliance to assist in lymphedema management, skin elasticity, and tissue density.  (progressing, not met)  4. Pt to show improved postural awareness and alignment.  (progressing, not met)  5. Pt to be I and compliant with HEP to allow for increased function in affected limb.   (progressing, not met)      Plan   Continue PT   2x   weekly for Complete Decongestive Therapy:  Manual lymphatic drainage, Multilayered short stretch bandaging, Pneumatic compression, Therapeutic exercises, Patient education as deemed necessary to achieve stated goals.      Leslie Hammonds, PTA

## 2024-02-26 ENCOUNTER — OFFICE VISIT (OUTPATIENT)
Dept: OTOLARYNGOLOGY | Facility: CLINIC | Age: 64
End: 2024-02-26
Payer: MEDICARE

## 2024-02-26 DIAGNOSIS — H65.22 CHRONIC SEROUS OTITIS MEDIA, LEFT EAR: Primary | ICD-10-CM

## 2024-02-26 DIAGNOSIS — H90.A32 MIXED CONDUCTIVE AND SENSORINEURAL HEARING LOSS OF LEFT EAR WITH RESTRICTED HEARING OF RIGHT EAR: ICD-10-CM

## 2024-02-26 PROCEDURE — 99214 OFFICE O/P EST MOD 30 MIN: CPT | Mod: 25,S$GLB,, | Performed by: OTOLARYNGOLOGY

## 2024-02-26 PROCEDURE — 99999 PR PBB SHADOW E&M-EST. PATIENT-LVL III: CPT | Mod: PBBFAC,,, | Performed by: OTOLARYNGOLOGY

## 2024-02-26 RX ORDER — CIPROFLOXACIN AND DEXAMETHASONE 3; 1 MG/ML; MG/ML
SUSPENSION/ DROPS AURICULAR (OTIC)
Qty: 7.5 ML | Refills: 1 | Status: SHIPPED | OUTPATIENT
Start: 2024-02-26 | End: 2024-03-07 | Stop reason: SDUPTHER
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
## 2024-02-26 NOTE — PATIENT INSTRUCTIONS
Use the eardrops as prescribed pumping the ear after application twice daily for a week.  Follow up in 4 weeks with an audiogram 1st.  Return sooner with any concerns.      DRY EAR PRECAUTIONS    Water should be kept out of the ears to prevent secondary infection.  If water gets trapped in the ear twisted tissue can be used a wick out the ear.  Cotton balls or cotton balls with Vaseline in the shower may help prevent water from getting in the ears.

## 2024-02-26 NOTE — PROCEDURES
MYRINGOTOMY WITH TUBE PLACEMENT, [facility] [id number]    SIDE: left    FINDINGS: serous effusion, no glue    PROCEDURE:  After informed consent was obtained which all risks, benefits, limitations and alternatives to myringotomy with tube were discussed patient elected proceed.  Patient was placed in a reclined position and using the operating microscope the tympanic membrane was well visualized clearing any ceruminous or external auditory canal debris.      The anterior portion of the tympanic membrane focusing on the inferior quadrant was touched with phenol with a sterile applicator with good blanching of the tympanic membrane.      After allowing time for anesthetic effect a radial myringotomy was made with the myringotomy knife suction applied.  Findings as above.  A tube was placed within the myringotomy in good position.    TUBE(S):  1.14 mm ID silicone modified Lerner grodia    MEDICATION/DROPS: none    COMPLICATIONS:  none

## 2024-02-27 NOTE — PROGRESS NOTES
Physical Therapy Daily Treatment Note     Name: Estrella De Oliveira  Clinic Number: [id number]    Therapy Diagnosis:   Encounter Diagnoses   Name Primary?    Lymphedema Yes    Venous insufficiency     Leg swelling          Physician: Broderick Canales MD    Visit Date: 2/28/2024    Physician Orders: PT Eval and Treat - lymphedema  Medical Diagnosis from Referral: I89.0 (ICD-10-CM) - Lymphedema, not elsewhere classified I87.2 (ICD-10-CM) - Venous insufficiency (chronic) (peripheral)  Evaluation Date: 12/5/2023  Authorization Period Expiration: 12/31/2024  Plan of Care Expiration: 3/18/2024  Visit # / Visits authorized: 5/ 20     Time In: 1:10 pm ( late arrival)  Time Out: 1:55 pm  Total Billable Time: 45 minutes      Precautions: Standard and Diabetes    Subjective     Pt reports: she had to wash her compression so she put a regular therapeutic sock on and it helped the fluid stay down .   She was compliant with home exercise program.  Response to previous treatment: fine, no issues with bandages   Functional change: none so far     Pain: 0/10  Location: bilateral lower legs     Objective     Treatment:   Estrella Hall received the following manual therapy techniques:- Manual Lymphatic Drainage were applied to the: LLE for 30 minutes, including: MLD and short stretch compression bandaging       MANUAL LYMPHATIC DRAINAGE (MLD):    While supine with LEs elevated stimulation at terminus, along GI region, B inguinal regions, drainage of entire L LE eliezer lower leg, ankle, and foot with return proximally,  Use of Aquaphor due to dryness.   Educated in self massage to abdominal areas, B inguinal areas, thigh, and remaining LE within reach.    SEQUENTIAL COMPRESSION PUMP: full leg sleeve applied to L LE  VES 5200 with default setting with distal pressures starting at 45mmHg entire LE x 15 minutes    MULTILAYERED BANDAGING:  issued supplies and bandaged L LE with cotton stockinette, komprex section dorsum of foot, 2 komprex  wedges post malleoli, 2 komprex rolls ankle to knee, 1-8cm and 2- 10cm Durelast rolls foot to knee, to leave intact 12-24 hrs as tolerated, discontinue with any problems, return rolled bandages next session. Wash and wear schedules confirmed.   Komprex section added to anterior leg     Home Exercises Provided and Patient Education Provided     Education provided:    Remove bandages if painful, wear compression daily   PATIENT/FAMILY Education: bandaging wear schedule,  HEP,  Beginning of self massage,  Self or assisted bandaging, compression options, and Risk reduction    Written Home Exercises Provided: Patient instructed to cont prior HEP.  Exercises were reviewed and Estrella Hall was able to demonstrate them prior to the end of the session.  Estrella Hall demonstrated good  understanding of the education provided.       Assessment     Pt tolerated treatment well with no reports of pain     Pt presents with moderate edema secondary to unknown cause    Vaso pneumatic compression pump was used on pt's LLE     Pt's LLE was massaged to drain excess blood/ fluid. Pt was given info on steps and purpose of MLD.     Pt's LLE was bandaged using short stretch compression bandaging to apply graduated compression and aid in edema reduction. Pt was instructed to wear 12-24 hours and remove if painful. Pt was instructed on care of bandages and asked to bring bandages with them for next visit     Pt is complaint with use of compression in the form of edemawear size M    Pt would benefit from therapy and management of other health concerns to reduce edema, aid in finding compression and preparing for home management       Estrella Hall Is progressing well towards her goals.   Pt prognosis is Fair.     Pt will continue to benefit from skilled outpatient physical therapy to address the deficits listed in the problem list box on initial evaluation, provide pt/family education and to maximize pt's level of independence in the home and community  environment.     Pt's spiritual, cultural and educational needs considered and pt agreeable to plan of care and goals.     Anticipated barriers to physical therapy: transportation     Goals:     Short Term Goals: (6 weeks)  1. Patient will show decreased girth in L LE by up to 1 cm to allow for LE symmetry, shoe and clothing choice, and ability to apply needed compression.  (progressing, not met)   2. Patient will demonstrate 100% knowledge of lymphedema precautions and signs of infection to allow for reduced lymphedema risk, infection risk, and/or exacerbation of condition.  (progressing, not met)  3. Patient or caregiver will perform self-bandaging techniques and/or wearing of compression garments to allow for lymphatic drainage support, skin elasticity, and reduction in shape and size of limb. (progressing, not met)  4. Patient will perform self lymph drainage techniques to areas within reach to enhance lymphatic drainage and skin condition.  (progressing, not met)  5. Patient will tolerate daily activities with multilayered bandaging to allow for lymphatic and venous support.  (progressing, not met)     Long Term Goals: (12  weeks)  1. Patient will show decreased girth in L LE by up to 2 cm  to allow for LE symmetry, shoe and clothing choice, and ability to apply needed compression daily.  (progressing, not met)  2. Patient will show reduction in density to mild or less with improved contour of limb to allow for cosmesis, LE symmetry, infection risk reduction, and clothing and compression choice.   (progressing, not met)  3. Patient to tal/doff compression garment with daily compliance to assist in lymphedema management, skin elasticity, and tissue density.  (progressing, not met)  4. Pt to show improved postural awareness and alignment.  (progressing, not met)  5. Pt to be I and compliant with HEP to allow for increased function in affected limb.   (progressing, not met)      Plan   Continue PT  2x   weekly  for Complete Decongestive Therapy:  Manual lymphatic drainage, Multilayered short stretch bandaging, Pneumatic compression, Therapeutic exercises, Patient education as deemed necessary to achieve stated goals.      Leslie Hammonds, PTA

## 2024-02-28 ENCOUNTER — CLINICAL SUPPORT (OUTPATIENT)
Dept: REHABILITATION | Facility: HOSPITAL | Age: 64
End: 2024-02-28
Payer: MEDICARE

## 2024-02-28 DIAGNOSIS — M79.89 LEG SWELLING: ICD-10-CM

## 2024-02-28 DIAGNOSIS — I89.0 LYMPHEDEMA: Primary | ICD-10-CM

## 2024-02-28 DIAGNOSIS — I87.2 VENOUS INSUFFICIENCY: ICD-10-CM

## 2024-02-28 PROCEDURE — 97140 MANUAL THERAPY 1/> REGIONS: CPT | Mod: CQ

## 2024-02-28 PROCEDURE — 97016 VASOPNEUMATIC DEVICE THERAPY: CPT | Mod: CQ

## 2024-03-07 ENCOUNTER — TELEPHONE (OUTPATIENT)
Dept: OTOLARYNGOLOGY | Facility: CLINIC | Age: 64
End: 2024-03-07
Payer: MEDICARE

## 2024-03-07 RX ORDER — CIPROFLOXACIN AND DEXAMETHASONE 3; 1 MG/ML; MG/ML
SUSPENSION/ DROPS AURICULAR (OTIC)
Qty: 7.5 ML | Refills: 1 | Status: SHIPPED | OUTPATIENT
Start: 2024-03-07

## 2024-03-07 NOTE — TELEPHONE ENCOUNTER
Tried calling pt back. No answer and voicemail box is full. Will try again in the morning. Thanks, Kinsey

## 2024-03-07 NOTE — TELEPHONE ENCOUNTER
It is reasonable to repeat the Ciprodex for another week putting it into the ear and pumping it to get it to go through the tube.  It is also possible another process could be going on.  She may have had the tube plug and burst open with drainage from the middle ear.  She may have secondary fungal growth from the ear causing the fullness and some drainage and wet feeling.  None of this can be determine without seeing the patient.  Additional Ciprodex sent.

## 2024-03-07 NOTE — TELEPHONE ENCOUNTER
"Called pt back. Pt states that when she woke up today the left ear was "soaking wet" and plugged up. States that she thinks she was laying on that ear. States took qtip and touched the area, and it came up wet. Advised pt to wick out the ear using a twisted tissue until dry, do not use qtips. States that her ear felt clogged too. States that the ear has since drained. She still feels that the ear is draining. No pain in the ear at all. Pt states that she cannot find her Ciprodex drops, she misplaced them in her home. States that she only used them for 5 out of the 7 days she was advised to use them. Pt wants to know what she needs to do if this happens again? Advised that I would check with Dr. Higgins and would call pt back either this evening or tomorrow morning. Thanks, Kinsey  "

## 2024-03-07 NOTE — TELEPHONE ENCOUNTER
----- Message from Philomena Garcia sent at 3/7/2024  2:09 PM CST -----  Regarding: question  Contact: @ [phone]  Pt called in regards to her ear draining and she thing the tube came out need to know a plan of care .....Please call and adv @ [phone] she said it is clogged up again

## 2024-03-08 ENCOUNTER — TELEPHONE (OUTPATIENT)
Dept: OTOLARYNGOLOGY | Facility: CLINIC | Age: 64
End: 2024-03-08
Payer: MEDICARE

## 2024-03-08 NOTE — TELEPHONE ENCOUNTER
Called pt and advised of Dr. Higgins's recommendations. Pt states that she found the drops at home. Pt will do the drops for 1 more week. Pt will call back if no improvement. Pt states that the ear does not hurt at all. Thanks, Kinsey

## 2024-03-08 NOTE — TELEPHONE ENCOUNTER
----- Message from Sinai Dai sent at 3/8/2024 10:30 AM CST -----  Contact: pt  Type: Needs Medical Advice         Who Called: pt  Best Call Back Number:[phone]  Additional Information: Requesting a call back regarding pt returned call from office. Pt said not sure what going on with her phone. Pt asking for a call back please  Please Advise- Thank you

## 2024-03-13 ENCOUNTER — DOCUMENTATION ONLY (OUTPATIENT)
Dept: REHABILITATION | Facility: HOSPITAL | Age: 64
End: 2024-03-13
Payer: MEDICARE

## 2024-03-13 NOTE — PROGRESS NOTES
Pt presents to therapy with severe 9/10 left sided low back pain reported. She reports no BARRY and was just getting dressed to come to therapy when onset occurred. No improvement with positioning. She also reports dark urine , urinary urge/frequency and past history of kidney infection with similar pain. Pts doctor was called and spoke with reception who reported will notify pts M.D. and return call to advise what pt should do . Pt was monitored for remainder of session . Pt did report decrease in pain 3-4/10 at end of session and questions pulled muscle vs kidney. Was advised therapy will reach out when her doctor returns call. She should proceed to urgent care or ED if symptoms do not improve or worsen . Pt verbalized understanding.     Leslie Hammonds, PTA  Co-treated/assessed by Verónica Meza , PT

## 2024-03-18 ENCOUNTER — CLINICAL SUPPORT (OUTPATIENT)
Dept: REHABILITATION | Facility: HOSPITAL | Age: 64
End: 2024-03-18
Payer: MEDICARE

## 2024-03-18 DIAGNOSIS — M79.89 LEG SWELLING: ICD-10-CM

## 2024-03-18 DIAGNOSIS — I87.2 VENOUS INSUFFICIENCY: ICD-10-CM

## 2024-03-18 DIAGNOSIS — I89.0 LYMPHEDEMA: Primary | ICD-10-CM

## 2024-03-18 PROCEDURE — 97140 MANUAL THERAPY 1/> REGIONS: CPT | Mod: CQ

## 2024-03-18 NOTE — PROGRESS NOTES
Physical Therapy Daily Treatment Note     Name: Estrella De Oliveira  Clinic Number: [id number]    Therapy Diagnosis:   Encounter Diagnoses   Name Primary?    Lymphedema Yes    Venous insufficiency     Leg swelling        Physician: Broderick Canales MD    Visit Date: 3/18/2024    Physician Orders: PT Eval and Treat - lymphedema  Medical Diagnosis from Referral: I89.0 (ICD-10-CM) - Lymphedema, not elsewhere classified I87.2 (ICD-10-CM) - Venous insufficiency (chronic) (peripheral)  Evaluation Date: 12/5/2023  Authorization Period Expiration: 12/31/2024  Plan of Care Expiration: 3/18/2024  Visit # / Visits authorized: 6/ 20     Time In: 1:30 pm  Time Out: 2:20 pm  Total Billable Time: 50 minutes      Precautions: Standard and Diabetes    Subjective     Pt reports: she is feeling a little bit better today. She went to the ER and they gave her a Tramadol shot. She took a fluid pill and thinks it helped a little with the swelling.   She was compliant with home exercise program.  Response to previous treatment: good response  Functional change: none so far     Pain: 0/10  Location: bilateral lower legs     Objective     Treatment:   Estrella Hall received the following manual therapy techniques:- Manual Lymphatic Drainage were applied to the: LLE for 50 minutes, including: MLD and short stretch compression bandaging     MANUAL LYMPHATIC DRAINAGE (MLD):    While supine with LEs elevated stimulation at terminus, along GI region, B inguinal regions, drainage of entire L LE eliezer lower leg, ankle, and foot with return proximally,  Use of Aquaphor due to dryness.   Educated in self massage to abdominal areas, B inguinal areas, thigh, and remaining LE within reach.    MULTILAYERED BANDAGING:  issued supplies and bandaged L LE with cotton stockinette, komprex section dorsum of foot, 2 komprex wedges post malleoli, 2 komprex rolls ankle to knee, 1-8cm and 2- 10cm Durelast rolls foot to knee, to leave intact 12-24 hrs as tolerated,  discontinue with any problems, return rolled bandages next session. Wash and wear schedules confirmed.   Komprex section added to anterior leg     Home Exercises Provided and Patient Education Provided     Education provided:    Remove bandages if painful, wear compression daily   PATIENT/FAMILY Education: bandaging wear schedule,  HEP,  Beginning of self massage,  Self or assisted bandaging, compression options, and Risk reduction    Written Home Exercises Provided: Patient instructed to cont prior HEP.  Exercises were reviewed and Estrella Hall was able to demonstrate them prior to the end of the session.  Estrella Hall demonstrated good  understanding of the education provided.       Assessment     Pt tolerated treatment well with no reports of pain     Pt presents with moderate edema secondary to unknown cause    Vaso pneumatic compression pump was used on pt's LLE     Pt's LLE was massaged to drain excess blood/ fluid. Pt was given info on steps and purpose of MLD.     Pt's LLE was bandaged using short stretch compression bandaging to apply graduated compression and aid in edema reduction. Pt was instructed to wear 12-24 hours and remove if painful. Pt was instructed on care of bandages and asked to bring bandages with them for next visit     Pt is complaint with use of compression in the form of edemawear size M    Pt would benefit from therapy and management of other health concerns to reduce edema, aid in finding compression and preparing for home management       Estrella Hall Is progressing well towards her goals.   Pt prognosis is Fair.     Pt will continue to benefit from skilled outpatient physical therapy to address the deficits listed in the problem list box on initial evaluation, provide pt/family education and to maximize pt's level of independence in the home and community environment.     Pt's spiritual, cultural and educational needs considered and pt agreeable to plan of care and goals.     Anticipated  barriers to physical therapy: transportation     Goals:     Short Term Goals: (6 weeks)  1. Patient will show decreased girth in L LE by up to 1 cm to allow for LE symmetry, shoe and clothing choice, and ability to apply needed compression.  (progressing, not met)   2. Patient will demonstrate 100% knowledge of lymphedema precautions and signs of infection to allow for reduced lymphedema risk, infection risk, and/or exacerbation of condition.  (progressing, not met)  3. Patient or caregiver will perform self-bandaging techniques and/or wearing of compression garments to allow for lymphatic drainage support, skin elasticity, and reduction in shape and size of limb. (progressing, not met)  4. Patient will perform self lymph drainage techniques to areas within reach to enhance lymphatic drainage and skin condition.  (progressing, not met)  5. Patient will tolerate daily activities with multilayered bandaging to allow for lymphatic and venous support.  (progressing, not met)     Long Term Goals: (12  weeks)  1. Patient will show decreased girth in L LE by up to 2 cm  to allow for LE symmetry, shoe and clothing choice, and ability to apply needed compression daily.  (progressing, not met)  2. Patient will show reduction in density to mild or less with improved contour of limb to allow for cosmesis, LE symmetry, infection risk reduction, and clothing and compression choice.   (progressing, not met)  3. Patient to tal/doff compression garment with daily compliance to assist in lymphedema management, skin elasticity, and tissue density.  (progressing, not met)  4. Pt to show improved postural awareness and alignment.  (progressing, not met)  5. Pt to be I and compliant with HEP to allow for increased function in affected limb.   (progressing, not met)      Plan   Continue PT  2x   weekly for Complete Decongestive Therapy:  Manual lymphatic drainage, Multilayered short stretch bandaging, Pneumatic compression, Therapeutic  exercises, Patient education as deemed necessary to achieve stated goals.      Leslie Hammonds, PTA

## 2024-03-20 ENCOUNTER — CLINICAL SUPPORT (OUTPATIENT)
Dept: REHABILITATION | Facility: HOSPITAL | Age: 64
End: 2024-03-20
Payer: MEDICARE

## 2024-03-20 DIAGNOSIS — I87.2 VENOUS INSUFFICIENCY: ICD-10-CM

## 2024-03-20 DIAGNOSIS — M79.89 LEG SWELLING: ICD-10-CM

## 2024-03-20 DIAGNOSIS — I89.0 LYMPHEDEMA: Primary | ICD-10-CM

## 2024-03-20 PROCEDURE — 97535 SELF CARE MNGMENT TRAINING: CPT

## 2024-03-20 PROCEDURE — 97140 MANUAL THERAPY 1/> REGIONS: CPT

## 2024-03-20 NOTE — PROGRESS NOTES
See evaluation in POC for goals and assessment     Eval Date: 03/31/2024    Verónica Meza, PT, DPT, CLT

## 2024-03-25 ENCOUNTER — OFFICE VISIT (OUTPATIENT)
Dept: OTOLARYNGOLOGY | Facility: CLINIC | Age: 64
End: 2024-03-25
Payer: MEDICARE

## 2024-03-25 ENCOUNTER — CLINICAL SUPPORT (OUTPATIENT)
Dept: AUDIOLOGY | Facility: CLINIC | Age: 64
End: 2024-03-25
Payer: MEDICARE

## 2024-03-25 ENCOUNTER — CLINICAL SUPPORT (OUTPATIENT)
Dept: OPHTHALMOLOGY | Facility: CLINIC | Age: 64
End: 2024-03-25
Payer: MEDICARE

## 2024-03-25 ENCOUNTER — OFFICE VISIT (OUTPATIENT)
Dept: OPHTHALMOLOGY | Facility: CLINIC | Age: 64
End: 2024-03-25
Payer: MEDICARE

## 2024-03-25 DIAGNOSIS — R05.1 ACUTE COUGH: ICD-10-CM

## 2024-03-25 DIAGNOSIS — J30.9 ALLERGIC RHINITIS, UNSPECIFIED SEASONALITY, UNSPECIFIED TRIGGER: ICD-10-CM

## 2024-03-25 DIAGNOSIS — H92.12 OTORRHEA, LEFT: Primary | ICD-10-CM

## 2024-03-25 DIAGNOSIS — H54.40 BLINDNESS OF LEFT EYE WITH NORMAL VISION IN CONTRALATERAL EYE: ICD-10-CM

## 2024-03-25 DIAGNOSIS — H90.A32 MIXED CONDUCTIVE AND SENSORINEURAL HEARING LOSS OF LEFT EAR WITH RESTRICTED HEARING OF RIGHT EAR: Primary | ICD-10-CM

## 2024-03-25 DIAGNOSIS — J06.9 UPPER RESPIRATORY TRACT INFECTION, UNSPECIFIED TYPE: ICD-10-CM

## 2024-03-25 DIAGNOSIS — D32.0 MENINGIOMA OF OPTIC NERVE SHEATH: Primary | ICD-10-CM

## 2024-03-25 DIAGNOSIS — H53.15 VISUAL DISTORTIONS OF SHAPE AND SIZE: ICD-10-CM

## 2024-03-25 PROCEDURE — 92567 TYMPANOMETRY: CPT | Mod: S$GLB,,, | Performed by: AUDIOLOGIST

## 2024-03-25 PROCEDURE — 92083 EXTENDED VISUAL FIELD XM: CPT | Mod: S$GLB,,, | Performed by: STUDENT IN AN ORGANIZED HEALTH CARE EDUCATION/TRAINING PROGRAM

## 2024-03-25 PROCEDURE — 99999 PR PBB SHADOW E&M-EST. PATIENT-LVL III: CPT | Mod: PBBFAC,,, | Performed by: OTOLARYNGOLOGY

## 2024-03-25 PROCEDURE — 99214 OFFICE O/P EST MOD 30 MIN: CPT | Mod: S$GLB,,, | Performed by: OTOLARYNGOLOGY

## 2024-03-25 PROCEDURE — 99205 OFFICE O/P NEW HI 60 MIN: CPT | Mod: S$GLB,,, | Performed by: STUDENT IN AN ORGANIZED HEALTH CARE EDUCATION/TRAINING PROGRAM

## 2024-03-25 PROCEDURE — 92557 COMPREHENSIVE HEARING TEST: CPT | Mod: S$GLB,,, | Performed by: AUDIOLOGIST

## 2024-03-25 PROCEDURE — 99999 PR PBB SHADOW E&M-EST. PATIENT-LVL IV: CPT | Mod: PBBFAC,,, | Performed by: STUDENT IN AN ORGANIZED HEALTH CARE EDUCATION/TRAINING PROGRAM

## 2024-03-25 PROCEDURE — 92133 CPTRZD OPH DX IMG PST SGM ON: CPT | Mod: S$GLB,,, | Performed by: STUDENT IN AN ORGANIZED HEALTH CARE EDUCATION/TRAINING PROGRAM

## 2024-03-25 PROCEDURE — 99999 PR PBB SHADOW E&M-EST. PATIENT-LVL I: CPT | Mod: PBBFAC,,, | Performed by: AUDIOLOGIST

## 2024-03-25 RX ORDER — OLOPATADINE HYDROCHLORIDE 665 UG/1
1 SPRAY NASAL 2 TIMES DAILY PRN
Qty: 30.5 G | Refills: 5 | Status: SHIPPED | OUTPATIENT
Start: 2024-03-25

## 2024-03-25 RX ORDER — MOMETASONE FUROATE 50 UG/1
1 SPRAY, METERED NASAL 2 TIMES DAILY
Qty: 51 G | Refills: 3 | Status: SHIPPED | OUTPATIENT
Start: 2024-03-25

## 2024-03-25 RX ORDER — CODEINE PHOSPHATE AND GUAIFENESIN 10; 100 MG/5ML; MG/5ML
5 SOLUTION ORAL 3 TIMES DAILY PRN
Qty: 150 ML | Refills: 0 | Status: SHIPPED | OUTPATIENT
Start: 2024-03-25 | End: 2024-04-04
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
## 2024-03-25 NOTE — PROGRESS NOTES
VISUAL FIELD TEST 24-2 SFAST-OD ONLY- DONE/AD  OD-REL-FIX-COOP-GOOD/AD  OS- NLP    PT HAS KNOWN ALLERGIES TO ADHESIVES. USED PIRATE PATCH/AD

## 2024-03-25 NOTE — PROGRESS NOTES
Audiologic Evaluation 3/25/2024:       Estrella De Oliveira, a 64 y.o. female, was seen today in the clinic for an audiologic evaluation.  Ms. De Oliveira reported left ear drainage and left PE tube. She reported she hears better from her right ear than her left ear. Ms. De Oliveira denied tinnitus, otalgia, and dizziness.      Tympanometry revealed Type A tympanogram in the right ear and Type B tympanogram with large ear canal volume in the left ear. Audiogram results revealed slight sloping to moderately severe sensorineural hearing loss in the right ear and mild sloping to profound sensorineural hearing loss in the left ear.  Speech reception thresholds were noted at 30 dB in the right ear and 35 dB in the left ear.  Speech discrimination scores were 96% in the right ear and 88% in the left ear.    Recommendations:  Otologic evaluation  Hearing aid consultation with medical clearance  Annual audiogram  Hearing protection when in noise
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
## 2024-03-26 ENCOUNTER — PATIENT MESSAGE (OUTPATIENT)
Dept: OPTOMETRY | Facility: CLINIC | Age: 64
End: 2024-03-26
Payer: MEDICARE

## 2024-03-26 ENCOUNTER — TELEPHONE (OUTPATIENT)
Dept: OPTOMETRY | Facility: CLINIC | Age: 64
End: 2024-03-26
Payer: MEDICARE

## 2024-04-01 NOTE — PLAN OF CARE
Physical Therapy Daily Treatment Note/ POC Update      Name: Estrella De Oliveira  Clinic Number: [id number]    Therapy Diagnosis:   Encounter Diagnoses   Name Primary?    Lymphedema Yes    Venous insufficiency     Leg swelling        Physician: Broderick Canales MD    Visit Date: 3/20/2024    Physician Orders: PT Eval and Treat - lymphedema  Medical Diagnosis from Referral: I89.0 (ICD-10-CM) - Lymphedema, not elsewhere classified I87.2 (ICD-10-CM) - Venous insufficiency (chronic) (peripheral)  Evaluation Date: 12/5/2023  Authorization Period Expiration: 12/31/2024  Plan of Care Expiration: 4/20/2024  Visit # / Visits authorized: 7/ 20     Time In: 1:30 pm  Time Out: 2:30 pm  Total Billable Time: 60 minutes      Precautions: Standard and Diabetes    Subjective     Pt reports: She is doing ok, she has been wearing her temporary compression  She was compliant with home exercise program.  Response to previous treatment: good response  Functional change: none so far     Pain: 0/10  Location: bilateral lower legs     Objective     3/20/2024:         Treatment:   Estrella Hall received the following manual therapy techniques:- Manual Lymphatic Drainage were applied to the: LLE for 30 minutes, including: MLD and short stretch compression bandaging       MULTILAYERED BANDAGING:  issued supplies and bandaged L LE with cotton stockinette, komprex section dorsum of foot, 2 komprex wedges post malleoli, 2 komprex rolls ankle to knee, 1-8cm and 2- 10cm Durelast rolls foot to knee, to leave intact 12-24 hrs as tolerated, discontinue with any problems, return rolled bandages next session. Wash and wear schedules confirmed.       ADLs:   Pt issued orders for compression   [facility] called for pt, therapist assisted in scheduling pt for fitting appointment  Pt educated on compression, proper fit, donning and doffing, wear times     Home Exercises Provided and Patient Education Provided     Education provided:    Remove bandages if painful,  wear compression daily   PATIENT/FAMILY Education: bandaging wear schedule,  HEP,  Beginning of self massage,  Self or assisted bandaging, compression options, and Risk reduction    Written Home Exercises Provided: Patient instructed to cont prior HEP.  Exercises were reviewed and Estrella Hall was able to demonstrate them prior to the end of the session.  Estrella Hall demonstrated good  understanding of the education provided.       Assessment     Pt tolerated treatment well with no reports of pain     Pt presents with moderate edema secondary to unknown cause    Measurements show good decreases in edema of pt's BLE    Patient was educated on compression options and was issued ordered and assist pt in getting fitting appointment    Pt's LLE was bandaged using short stretch compression bandaging to apply graduated compression and aid in edema reduction. Pt was instructed to wear 12-24 hours and remove if painful. Pt was instructed on care of bandages and asked to bring bandages with them for next visit     Pt is complaint with use of compression in the form of edemawear size M    Pt would benefit from therapy and management of other health concerns to reduce edema, aid in finding compression and preparing for home management       Estrella Hall Is progressing well towards her goals.   Pt prognosis is Fair.     Pt will continue to benefit from skilled outpatient physical therapy to address the deficits listed in the problem list box on initial evaluation, provide pt/family education and to maximize pt's level of independence in the home and community environment.     Pt's spiritual, cultural and educational needs considered and pt agreeable to plan of care and goals.     Anticipated barriers to physical therapy: transportation     Goals:     Short Term Goals: (6 weeks)  1. Patient will show decreased girth in L LE by up to 1 cm to allow for LE symmetry, shoe and clothing choice, and ability to apply needed compression.  MET  2.  Patient will demonstrate 100% knowledge of lymphedema precautions and signs of infection to allow for reduced lymphedema risk, infection risk, and/or exacerbation of condition. MET   3. Patient or caregiver will perform self-bandaging techniques and/or wearing of compression garments to allow for lymphatic drainage support, skin elasticity, and reduction in shape and size of limb. MET   4. Patient will perform self lymph drainage techniques to areas within reach to enhance lymphatic drainage and skin condition.  (progressing, not met)  5. Patient will tolerate daily activities with multilayered bandaging to allow for lymphatic and venous support. MET      Long Term Goals: (12  weeks)  1. Patient will show decreased girth in L LE by up to 2 cm  to allow for LE symmetry, shoe and clothing choice, and ability to apply needed compression daily. Progressing  2. Patient will show reduction in density to mild or less with improved contour of limb to allow for cosmesis, LE symmetry, infection risk reduction, and clothing and compression choice.   (progressing, not met)  3. Patient to tal/doff compression garment with daily compliance to assist in lymphedema management, skin elasticity, and tissue density.  (progressing, not met)  4. Pt to show improved postural awareness and alignment.  (progressing, not met)  5. Pt to be I and compliant with HEP to allow for increased function in affected limb.   (progressing, not met)      Plan   Requesting POC extension to make further reductions and aid in finding compression      Verónica Derrick, PT

## 2024-04-03 ENCOUNTER — TELEPHONE (OUTPATIENT)
Dept: OTOLARYNGOLOGY | Facility: CLINIC | Age: 64
End: 2024-04-03
Payer: MEDICARE

## 2024-04-03 DIAGNOSIS — H90.A32 MIXED CONDUCTIVE AND SENSORINEURAL HEARING LOSS OF LEFT EAR WITH RESTRICTED HEARING OF RIGHT EAR: Primary | ICD-10-CM

## 2024-04-03 NOTE — TELEPHONE ENCOUNTER
----- Message from Iwona Rory sent at 4/3/2024  4:47 PM CDT -----  Regarding: Patient advice  Contact: Pt  [phone]          Name of Caller:  Isabel Juárez Preference:  [phone]    Nature of Call:  Called to touch bases states her ear with the tube is still leaking and has a slioght fever when checking temp by ear

## 2024-04-04 NOTE — TELEPHONE ENCOUNTER
Tried calling for pt again. Phone rang this time, no answer. Call went to voicemail and recording stated mailbox was full. Will try again later. Thanks, Kinsey

## 2024-04-04 NOTE — TELEPHONE ENCOUNTER
Pt called back. States that she noticed that her temperature is different via the ear thermometer. States that the left ear reads higher than the right ear. Read 101 on the left and 99.8 on the right. States that she still has clear drainage from the left ear. States that the drainage seems worse when she stands up. States that she is still using the drops. Wants to know if she needs to do anything else? Advised will send her message to Dr. Higgins for review and will call pt back. Thanks, Kinsey

## 2024-04-04 NOTE — TELEPHONE ENCOUNTER
Attempted to call pt back again. Line rings fast busy signal. Will try again later. Thanks, Kinsey

## 2024-04-04 NOTE — TELEPHONE ENCOUNTER
Attempted to call pt back with Dr. Higgins's recommendations. No answer and voicemail is full. Thanks, Kinsey

## 2024-04-04 NOTE — TELEPHONE ENCOUNTER
Pt called back. Went over recommendations per Dr. Higgins. Pt declined appt Monday stating transportation concerns. Scheduled MRI for 4/9/24 at [facility]. Scheduled f/u with Dr. Higgins for the following Monday, 4/15/24. Thanks, Kinsey

## 2024-04-04 NOTE — TELEPHONE ENCOUNTER
Adama Higgins MD [facility] (2:51 PM)     TS  Patient needs to come in for microscopic evaluation and hopefully evaluation will result in an fluid being able to be evacuated into a [employer] trap that we can send it for beta 2 transferrin.  I have also ordered a MRI of the brain IAC's without contrast to evaluate for any abnormal CSF connection.

## 2024-04-15 ENCOUNTER — OFFICE VISIT (OUTPATIENT)
Dept: OTOLARYNGOLOGY | Facility: CLINIC | Age: 64
End: 2024-04-15
Payer: MEDICARE

## 2024-04-15 VITALS — TEMPERATURE: 98 F

## 2024-04-15 DIAGNOSIS — H65.22 CHRONIC SEROUS OTITIS MEDIA, LEFT EAR: ICD-10-CM

## 2024-04-15 DIAGNOSIS — H05.229 ORBITAL SWELLING: ICD-10-CM

## 2024-04-15 DIAGNOSIS — H90.A32 MIXED CONDUCTIVE AND SENSORINEURAL HEARING LOSS OF LEFT EAR WITH RESTRICTED HEARING OF RIGHT EAR: Primary | ICD-10-CM

## 2024-04-15 DIAGNOSIS — H92.12 OTORRHEA, LEFT: ICD-10-CM

## 2024-04-15 PROCEDURE — 99213 OFFICE O/P EST LOW 20 MIN: CPT | Mod: S$GLB,,, | Performed by: OTOLARYNGOLOGY

## 2024-04-15 PROCEDURE — 1126F AMNT PAIN NOTED NONE PRSNT: CPT | Mod: CPTII,S$GLB,, | Performed by: OTOLARYNGOLOGY

## 2024-04-15 PROCEDURE — 1159F MED LIST DOCD IN RCRD: CPT | Mod: CPTII,S$GLB,, | Performed by: OTOLARYNGOLOGY

## 2024-04-15 PROCEDURE — 99999 PR PBB SHADOW E&M-EST. PATIENT-LVL II: CPT | Mod: PBBFAC,,, | Performed by: OTOLARYNGOLOGY

## 2024-04-15 PROCEDURE — 1160F RVW MEDS BY RX/DR IN RCRD: CPT | Mod: CPTII,S$GLB,, | Performed by: OTOLARYNGOLOGY

## 2024-04-15 PROCEDURE — 4010F ACE/ARB THERAPY RXD/TAKEN: CPT | Mod: CPTII,S$GLB,, | Performed by: OTOLARYNGOLOGY
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
## 2024-04-19 ENCOUNTER — TELEPHONE (OUTPATIENT)
Dept: OPHTHALMOLOGY | Facility: CLINIC | Age: 64
End: 2024-04-19
Payer: MEDICARE

## 2024-04-28 ENCOUNTER — HOSPITAL ENCOUNTER (OUTPATIENT)
Dept: RADIOLOGY | Facility: HOSPITAL | Age: 64
Discharge: HOME | End: 2024-04-28
Attending: OTOLARYNGOLOGY
Payer: MEDICARE

## 2024-04-28 DIAGNOSIS — H90.A32 MIXED CONDUCTIVE AND SENSORINEURAL HEARING LOSS OF LEFT EAR WITH RESTRICTED HEARING OF RIGHT EAR: ICD-10-CM

## 2024-04-28 PROCEDURE — 70553 MRI BRAIN STEM W/O & W/DYE: CPT | Mod: 26,,, | Performed by: RADIOLOGY

## 2024-04-28 PROCEDURE — 25500020 PHARM REV CODE 255: Performed by: OTOLARYNGOLOGY

## 2024-04-28 PROCEDURE — 70553 MRI BRAIN STEM W/O & W/DYE: CPT | Mod: TC

## 2024-04-28 PROCEDURE — A9585 GADOBUTROL INJECTION: HCPCS | Performed by: OTOLARYNGOLOGY

## 2024-04-28 RX ORDER — GADOBUTROL 604.72 MG/ML
10 INJECTION INTRAVENOUS
Status: COMPLETED | OUTPATIENT
Start: 2024-04-28 | End: 2024-04-28

## 2024-04-28 RX ADMIN — GADOBUTROL 10 ML: 604.72 INJECTION INTRAVENOUS at 11:04

## 2024-05-10 ENCOUNTER — OFFICE VISIT (OUTPATIENT)
Dept: OPHTHALMOLOGY | Facility: CLINIC | Age: 64
End: 2024-05-10
Payer: MEDICARE

## 2024-05-10 DIAGNOSIS — D32.0 MENINGIOMA OF OPTIC NERVE SHEATH: Primary | ICD-10-CM

## 2024-05-10 DIAGNOSIS — H54.40 BLINDNESS OF LEFT EYE WITH NORMAL VISION IN CONTRALATERAL EYE: ICD-10-CM

## 2024-05-10 PROCEDURE — 92285 EXTERNAL OCULAR PHOTOGRAPHY: CPT | Mod: S$GLB,,, | Performed by: OPHTHALMOLOGY

## 2024-05-10 PROCEDURE — 4010F ACE/ARB THERAPY RXD/TAKEN: CPT | Mod: CPTII,S$GLB,, | Performed by: OPHTHALMOLOGY

## 2024-05-10 PROCEDURE — 1160F RVW MEDS BY RX/DR IN RCRD: CPT | Mod: CPTII,S$GLB,, | Performed by: OPHTHALMOLOGY

## 2024-05-10 PROCEDURE — 1101F PT FALLS ASSESS-DOCD LE1/YR: CPT | Mod: CPTII,S$GLB,, | Performed by: OPHTHALMOLOGY

## 2024-05-10 PROCEDURE — 3288F FALL RISK ASSESSMENT DOCD: CPT | Mod: CPTII,S$GLB,, | Performed by: OPHTHALMOLOGY

## 2024-05-10 PROCEDURE — 99214 OFFICE O/P EST MOD 30 MIN: CPT | Mod: S$GLB,,, | Performed by: OPHTHALMOLOGY

## 2024-05-10 PROCEDURE — 1159F MED LIST DOCD IN RCRD: CPT | Mod: CPTII,S$GLB,, | Performed by: OPHTHALMOLOGY

## 2024-05-10 PROCEDURE — 99999 PR PBB SHADOW E&M-EST. PATIENT-LVL II: CPT | Mod: PBBFAC,,, | Performed by: OPHTHALMOLOGY
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
## 2024-05-21 ENCOUNTER — OFFICE VISIT (OUTPATIENT)
Dept: RADIATION ONCOLOGY | Facility: CLINIC | Age: 64
End: 2024-05-21
Payer: MEDICARE

## 2024-05-21 VITALS
HEIGHT: 65 IN | RESPIRATION RATE: 20 BRPM | DIASTOLIC BLOOD PRESSURE: 87 MMHG | SYSTOLIC BLOOD PRESSURE: 162 MMHG | HEART RATE: 73 BPM | BODY MASS INDEX: 47.68 KG/M2 | OXYGEN SATURATION: 96 % | TEMPERATURE: 98 F | WEIGHT: 286.19 LBS

## 2024-05-21 DIAGNOSIS — D32.0 MENINGIOMA OF OPTIC NERVE SHEATH: ICD-10-CM

## 2024-05-21 PROCEDURE — 1159F MED LIST DOCD IN RCRD: CPT | Mod: CPTII,S$GLB,, | Performed by: RADIOLOGY

## 2024-05-21 PROCEDURE — 4010F ACE/ARB THERAPY RXD/TAKEN: CPT | Mod: CPTII,S$GLB,, | Performed by: RADIOLOGY

## 2024-05-21 PROCEDURE — 99999 PR PBB SHADOW E&M-EST. PATIENT-LVL III: CPT | Mod: PBBFAC,,, | Performed by: RADIOLOGY

## 2024-05-21 PROCEDURE — 1126F AMNT PAIN NOTED NONE PRSNT: CPT | Mod: CPTII,S$GLB,, | Performed by: RADIOLOGY

## 2024-05-21 PROCEDURE — 99204 OFFICE O/P NEW MOD 45 MIN: CPT | Mod: S$GLB,,, | Performed by: RADIOLOGY

## 2024-05-21 PROCEDURE — 3079F DIAST BP 80-89 MM HG: CPT | Mod: CPTII,S$GLB,, | Performed by: RADIOLOGY

## 2024-05-21 PROCEDURE — 3077F SYST BP >= 140 MM HG: CPT | Mod: CPTII,S$GLB,, | Performed by: RADIOLOGY

## 2024-05-21 PROCEDURE — 3008F BODY MASS INDEX DOCD: CPT | Mod: CPTII,S$GLB,, | Performed by: RADIOLOGY

## 2024-05-21 PROCEDURE — 1101F PT FALLS ASSESS-DOCD LE1/YR: CPT | Mod: CPTII,S$GLB,, | Performed by: RADIOLOGY

## 2024-05-21 PROCEDURE — 3288F FALL RISK ASSESSMENT DOCD: CPT | Mod: CPTII,S$GLB,, | Performed by: RADIOLOGY

## 2024-05-21 NOTE — PROGRESS NOTES
5/21/2024    Radiation Oncology Consultation    Assessment   This is a 64 y.o. 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 She had extensive work up at the time per earliest notes in Care Everywhere from 2015, though none of the original records are available. She reportedly had radiation at "[facility] [place] but does not remember the details. MRI Maxillofacial w and w/o contrast 11/11/23 noted stable enlargement of Left optic nerve in comparison to MRI Brain without contrast in 5/2016. She was referred to Dr. Kumar 3/25/24 for worsening proptosis of Left eye. MRI IAC 4/28/24 demonstrated Left globe proptosis with large enhancing lesion centered about the optic nerve. She was referred to Dr. Simpson who has recommended observation of proptosis. She is referred to me for consideration of treatment options.     I would like to obtain her imaging from 2016 and 2023 to compare size of lesion. If stable, no indication for additional radiation. I'll also try to get her prior radiation records but this may not be possible since "[facility] [place] closed in 2013.     In the absence of persistent symptoms, observation may be reasonable. She did enquire about enucleation and placement of prosthetic eye in the even that this lesion does progress. That may be favored over additional radiation if prior radiation records cannot be obtained.         Plan   1) Obtain prior radiation records and dosimetry if possible.   2) Obtain prior brain imaging (MRI Brain 5/2016 and MRI Maxillofacial 11/11/23) from [facility] in [place].  3) I will call patient with plan following obtaining outside records.          CHIEF COMPLAINT: Left optic nerve sheath meningioma    HPI/Focused ROS: Ms. De Oliveira is a 65 y/o female with h/o Left optic nerve sheath tumor with visual loss since at least 2010. She reportedly had radiation ~2010 at [facility] [place] in [place], though she does not recall who treated  her. She believes she only had a few treatments. MRI Maxillofacial w and w/o contrast 11/11/23 noted stable enlargement of Left optic nerve in comparison to MRI Brain without contrast in 5/2016. She was referred to Dr. Kumar 3/25/24 for worsening proptosis of Left eye. MRI IAC 4/28/24 demonstrated Left globe proptosis with large enhancing lesion centered about the optic nerve. She was referred to Dr. Simpson who has recommended observation of proptosis. She is referred to me for consideration of treatment options.     In clinic today, she is unaccompanied. She notes that recent work up of proptosis was following an acute episode of pain behind the Left eye that persisted for days and ultimately resolved with steroids. She has no vision on the Left. Reports that pain has not returned since that episode.       Is the patient female between ages 15-55:  no    Does the patient have a CIED:  no    Prior Radiation History: Course 1: Left orbit? No records and patient unsure of timing, possibly in 2010 at [facility] in [place]      Past Medical History:   Diagnosis Date    Essential (primary) hypertension     Legally blind in left eye, as defined in [place]     Type 2 diabetes mellitus with unspecified diabetic retinopathy without macular edema        Past Surgical History:   Procedure Laterality Date    GALLBLADDER SURGERY      [place] EVAL,SWALLOW FUNCTION,CINE/VIDEO RECORD  8/26/2021         REVISION OF TOTAL KNEE REPLACEMENT          Social History     Tobacco Use    Smoking status: Never    Smokeless tobacco: Never   Substance Use Topics    Alcohol use: Never    Drug use: Never       Cancer-related family history includes Breast cancer in her maternal aunt. There is no history of Ovarian cancer.    Current Outpatient Medications on File Prior to Visit   Medication Sig Dispense Refill    albuterol (PROVENTIL) 2.5 mg /3 mL (0.083 %) nebulizer solution albuterol sulfate 2.5 mg/3 mL (0.083 %) solution for nebulization       albuterol (VENTOLIN HFA) 90 mcg/actuation inhaler Ventolin HFA 90 mcg/actuation aerosol inhaler      amLODIPine (NORVASC) 10 MG tablet amlodipine 10 mg tablet      atorvastatin (LIPITOR) 20 MG tablet Take by mouth.      blood sugar diagnostic (BLOOD GLUCOSE TEST) Strp Check glucose twice daily and as needed      blood-glucose meter Misc Glucose twice daily and as needed      ciprofloxacin-dexAMETHasone 0.3-0.1% (CIPRODEX) 0.3-0.1 % DrpS 4-5 drops to affected ear(s) BID for one week 7.5 mL 1    cyclobenzaprine (FLEXERIL) 10 MG tablet cyclobenzaprine 10 mg tablet      diclofenac sodium (VOLTAREN) 1 % Gel diclofenac 1 % topical gel      enoxaparin (LOVENOX) 40 mg/0.4 mL Syrg enoxaparin 40 mg/0.4 mL subcutaneous syringe      esomeprazole (NEXIUM) 20 MG capsule Nexium      esomeprazole (NEXIUM) 40 MG capsule esomeprazole magnesium 40 mg capsule,delayed release      furosemide (LASIX) 40 MG tablet furosemide 40 mg tablet      gabapentin (NEURONTIN) 300 MG capsule gabapentin 300 mg capsule      hydroCHLOROthiazide (HYDRODIURIL) 25 MG tablet hydrochlorothiazide 25 mg tablet      HYDROcodone bitartrate (HYSINGLA ER) 40 mg TP24 Hysingla ER 40 mg tablet, crush resistant, extended release      hydrocortisone 2.5 % cream Apply topically 2 (two) times daily. 20 g 0    lancets Misc Check glucose twice daily and as needed      lisinopriL (PRINIVIL,ZESTRIL) 40 MG tablet SMARTSI Tablet(s) By Mouth Daily      meloxicam (MOBIC) 15 MG tablet meloxicam 15 mg tablet      metoprolol succinate (TOPROL-XL) 25 MG 24 hr tablet metoprolol succinate ER 25 mg tablet,extended release 24 hr      mometasone (NASONEX) 50 mcg/actuation nasal spray 1 spray by Nasal route 2 (two) times a day. Spray outwardly towards the ears.  Ideally AFTER a sinus rinse twice daily. 51 g 3    olopatadine (PATANASE) 0.6 % nasal spray 1 spray by Each Nostril route 2 (two) times daily as needed for Rhinitis. 30.5 g 5    oxybutynin (DITROPAN-XL) 10 MG 24 hr tablet  "oxybutynin chloride ER 10 mg tablet,extended release 24 hr      oxyCODONE-acetaminophen (PERCOCET)  mg per tablet oxycodone-acetaminophen 10 mg-325 mg tablet      predniSONE (DELTASONE) 10 MG tablet Take 20 mg by mouth.      sulfacetamide-prednisoLONE 10%-0.23%, 0.25%, (VASOCIDIN) 10 %-0.23 % (0.25 %) Drop sulfacetamide-prednisolone 10 %-0.23 % (0.25 %) eye drops      suvorexant (BELSOMRA) 20 mg Tab Take 1 tablet by mouth every evening.       No current facility-administered medications on file prior to visit.       Review of patient's allergies indicates:   Allergen Reactions    Adhesive      Other reaction(s): BLISTERS    Latex      Added based on information entered during log entry, please review and add reactions, type, and severity as needed    Pramipexole Itching     Patient states it caused her to have severe itching    Silicone Itching    Silicones      Other reaction(s): BLISTERS    Adhesive tape-silicones Rash         Vital Signs: BP (!) 162/87 (BP Location: Right arm, Patient Position: Sitting)   Pulse 73   Temp 97.5 °F (36.4 °C)   Resp 20   Ht 5' 5" (1.651 m)   Wt 129.8 kg (286 lb 2.5 oz)   SpO2 96%   BMI 47.62 kg/m²     ECOG Performance Status: (1) Restricted in physically strenuous activity, ambulatory and able to do work of light nature    Physical Exam  Constitutional:       Appearance: Normal appearance.   HENT:      Head: Normocephalic and atraumatic.   Eyes:      General: No scleral icterus.     Extraocular Movements: Extraocular movements intact.      Comments: +Left eye mild proptosis and periorbital edema   Pulmonary:      Effort: No accessory muscle usage or respiratory distress.   Musculoskeletal:      Cervical back: Normal range of motion.   Neurological:      Mental Status: She is alert and oriented to person, place, and time.   Psychiatric:         Mood and Affect: Mood and affect normal.         Judgment: Judgment normal.          Labs:    Imaging: I have personally reviewed " the patient's available images and reports and summarized pertinent findings above in HPI.     Pathology: No path      - Thank you for allowing me to participate in the care of your patient.    Curry Brasher MD, PhD

## 2024-07-23 ENCOUNTER — TELEPHONE (OUTPATIENT)
Dept: ENDOSCOPY | Facility: HOSPITAL | Age: 64
End: 2024-07-23
Payer: MEDICARE

## 2024-07-23 VITALS — WEIGHT: 265 LBS | BODY MASS INDEX: 44.15 KG/M2 | HEIGHT: 65 IN

## 2024-07-23 DIAGNOSIS — Z12.11 SCREEN FOR COLON CANCER: Primary | ICD-10-CM

## 2024-07-23 NOTE — TELEPHONE ENCOUNTER
----- Message from Ananya Castro sent at 7/19/2024  8:04 AM CDT -----  Regarding: FW: colonoscopy    ----- Message -----  From: Rayna Lui  Sent: 7/18/2024   8:52 AM CDT  To: [facility] Endoscopy Schedulers  Subject: colonoscopy                                      LISBETH Stapleton. Is ordering a colonoscopy for this pt can you plz ctc him to schedule appt?     Referral/records in media     Thank you,  Rayna Lui   [facility]

## 2024-07-23 NOTE — TELEPHONE ENCOUNTER
Spoke to Estrella to schedule procedure(s) Colonoscopy       Physician to perform procedure(s) Dr. YOLY Izquierdo  Date of Procedure (s) 8/22/24  Arrival Time 7:40 AM  Time of Procedure(s) 8:40 AM   Location of Procedure(s) [facility] 4th Floor  Type of Rx Prep sent to patient: PEG  Instructions provided to patient via [facility]    Patient was informed on the following information and verbalized understanding. Screening questionnaire reviewed with patient and complete. If procedure requires anesthesia, a responsible adult needs to be present to accompany the patient home, patient cannot drive after receiving anesthesia. Appointment details are tentative, especially check-in time. Patient will receive a prep-op call 7 days prior to confirm check-in time for procedure. If applicable the patient should contact their pharmacy to verify Rx for procedure prep is ready for pick-up. Patient was advised to call the scheduling department at [phone] if pharmacy states no Rx is available. Patient was advised to call the endoscopy scheduling department if any questions or concerns arise.      [facility] Endoscopy Scheduling Department

## 2024-08-14 ENCOUNTER — TELEPHONE (OUTPATIENT)
Dept: ENDOSCOPY | Facility: HOSPITAL | Age: 64
End: 2024-08-14
Payer: MEDICARE

## 2024-08-14 NOTE — TELEPHONE ENCOUNTER
Spoke to pt for pre-call to confirm scheduled Colonoscopy and patient verbalized understanding of the following:       Date of Procedure (s)  verified 8/22/24  Arrival Time 7:40 AM verified.  Location of Procedure(s) [facility] 4th Floor verified.  NPO status reinforced. Ok to continue clear liquids up until 4 hours prior to the Endoscopy procedure.     Confirmed Pt is currently taking Mounjaro (Tirzepatide), Pt instructed to stop stop taking Mounjaro (Tirzepatide) on 8/14/24.     Last dose 8/7/24      Pt confirmed receipt of prep instructions and Rx prep (if applicable).  Instructions provided to patient via email  Pt confirmed ride home after procedure if procedure requires anesthesia.   Pre-call screening questionnaire reviewed and completed with patient.   Appointment details are tentative, including check-in time.  If the patient begins taking any blood thinning medications, injectable weight loss/diabetes medications (other than insulin), or Adipex (phentermine) patient was instructed to contact the endoscopy scheduling department as soon as possible.  Patient was advised to call the endoscopy scheduling department if any questions or concerns arise.       [facility]oscopy Scheduling Department

## 2024-08-22 ENCOUNTER — TELEPHONE (OUTPATIENT)
Dept: ENDOSCOPY | Facility: HOSPITAL | Age: 64
End: 2024-08-22
Payer: MEDICARE

## 2024-08-22 NOTE — TELEPHONE ENCOUNTER
Received patient's call. Patient states she is still not coming out clear and will need to reschedule her colonoscopy. Offered to reschedule, patient declined. She states she will call back to reschedule on a later date. Patient removed from today's schedule. Patient verbalized an understanding.

## 2024-08-22 NOTE — TELEPHONE ENCOUNTER
Pt lvm that  colonoscopy  prep did not start working until 7am this morning. Called pt and re-scheduled to this afternoon. Pt will call around 1130 if she is not clear. 4th floor lab aware.new arrival time 12noon

## 2024-11-02 ENCOUNTER — HOSPITAL ENCOUNTER (EMERGENCY)
Facility: OTHER | Age: 64
Discharge: HOME | End: 2024-11-02
Attending: EMERGENCY MEDICINE
Payer: MEDICARE

## 2024-11-02 VITALS
RESPIRATION RATE: 16 BRPM | DIASTOLIC BLOOD PRESSURE: 107 MMHG | HEART RATE: 72 BPM | WEIGHT: 249 LBS | SYSTOLIC BLOOD PRESSURE: 147 MMHG | BODY MASS INDEX: 41.48 KG/M2 | HEIGHT: 65 IN | TEMPERATURE: 99 F | OXYGEN SATURATION: 97 %

## 2024-11-02 DIAGNOSIS — U07.1 COVID-19 VIRUS INFECTION: ICD-10-CM

## 2024-11-02 DIAGNOSIS — U07.1 COVID-19 VIRUS DETECTED: ICD-10-CM

## 2024-11-02 LAB
CTP QC/QA: YES
CTP QC/QA: YES
POC MOLECULAR INFLUENZA A AGN: NEGATIVE
POC MOLECULAR INFLUENZA B AGN: NEGATIVE
POCT GLUCOSE: 97 MG/DL (ref 70–110)
SARS-COV-2 RDRP RESP QL NAA+PROBE: POSITIVE

## 2024-11-02 PROCEDURE — 82962 GLUCOSE BLOOD TEST: CPT

## 2024-11-02 PROCEDURE — 99284 EMERGENCY DEPT VISIT MOD MDM: CPT | Mod: 25

## 2024-11-02 PROCEDURE — 87635 SARS-COV-2 COVID-19 AMP PRB: CPT | Performed by: PHYSICIAN ASSISTANT

## 2024-11-02 PROCEDURE — 63600175 PHARM REV CODE 636 W HCPCS: Performed by: EMERGENCY MEDICINE

## 2024-11-02 PROCEDURE — 96372 THER/PROPH/DIAG INJ SC/IM: CPT | Performed by: EMERGENCY MEDICINE

## 2024-11-02 RX ORDER — KETOROLAC TROMETHAMINE 30 MG/ML
15 INJECTION, SOLUTION INTRAMUSCULAR; INTRAVENOUS
Status: COMPLETED | OUTPATIENT
Start: 2024-11-02 | End: 2024-11-02

## 2024-11-02 RX ADMIN — KETOROLAC TROMETHAMINE 15 MG: 30 INJECTION, SOLUTION INTRAMUSCULAR; INTRAVENOUS at 03:11

## 2024-11-02 NOTE — ED PROVIDER NOTES
Encounter Date: 11/2/2024       History     Chief Complaint   Patient presents with    Generalized Body Aches     Generalized body aches with subjective fever onset yesterday. Denies n/v/d/c, SOB, CP.      64-year-old female with history of HTN, DM, arthritis, chronic low back pain presents for evaluation of fevers and body aches.  Patient was at normal baseline, yesterday afternoon started having subjective fevers.  When she woke up later she felt malaise, fatigue, and achiness throughout her body.  She took a Percocet that she takes regularly for her low back pain as well as some NyQuil, and went back to sleep.  Today she had persistent body aches of all joints and also her ribcage, as well as persistent malaise, but denies any recurrent fevers.  She has a minimal cough but no SOB or wheezing.  She also has had a decreased appetite today but denies any vomiting or diarrhea.  No known sick contacts.      Review of patient's allergies indicates:   Allergen Reactions    Adhesive      Other reaction(s): BLISTERS    Latex      Added based on information entered during log entry, please review and add reactions, type, and severity as needed    Pramipexole Itching     Patient states it caused her to have severe itching    Silicone Itching    Silicones      Other reaction(s): BLISTERS    Adhesive tape-silicones Rash     Past Medical History:   Diagnosis Date    Essential (primary) hypertension     Legally blind in left eye, as defined in [place]     Type 2 diabetes mellitus with unspecified diabetic retinopathy without macular edema      Past Surgical History:   Procedure Laterality Date    GALLBLADDER SURGERY      CT ANTOLIN,SWALLOW FUNCTION,CINE/VIDEO RECORD  8/26/2021         REVISION OF TOTAL KNEE REPLACEMENT        Family History   Problem Relation Name Age of Onset    Colon cancer Brother      Breast cancer Maternal Aunt      Ovarian cancer Neg Hx       Social History     Tobacco Use    Smoking status: Never    Smokeless  tobacco: Never   Substance Use Topics    Alcohol use: Never    Drug use: Never     Review of Systems   Constitutional:  Positive for appetite change, fatigue and fever.   HENT:  Negative for congestion.    Eyes:  Negative for redness.   Respiratory:  Negative for shortness of breath.    Cardiovascular:  Negative for chest pain.   Gastrointestinal:  Negative for abdominal pain.   Genitourinary:  Negative for dysuria.   Musculoskeletal:  Positive for arthralgias and back pain.   Skin:  Negative for rash.   Neurological:  Positive for headaches.   Psychiatric/Behavioral:  Negative for confusion.        Physical Exam     Initial Vitals [11/02/24 1403]   BP Pulse Resp Temp SpO2   (!) 147/107 72 16 98.7 °F (37.1 °C) 97 %      MAP       --         Physical Exam    Constitutional: She is not diaphoretic. No distress.   Chronically ill-appearing, resting comfortably   HENT:   Head: Normocephalic and atraumatic.   Eyes: Conjunctivae are normal.   Neck: Neck supple.   Cardiovascular:  Normal rate, regular rhythm, S1 normal, S2 normal, normal heart sounds and intact distal pulses.           No murmur heard.  Pulmonary/Chest: Breath sounds normal. No respiratory distress. She has no wheezes. She has no rhonchi. She has no rales.   No rales or wheezing.  No focal chest wall or rib tenderness   Abdominal: Abdomen is soft. There is no abdominal tenderness. There is no rebound and no guarding.   Musculoskeletal:         General: No edema.      Cervical back: Neck supple.     Neurological: She is alert and oriented to person, place, and time.   Skin: Skin is warm and dry.   Psychiatric: She has a normal mood and affect.         ED Course   Procedures  Labs Reviewed   SARS-COV-2 RDRP GENE - Abnormal       Result Value    POC Rapid COVID Positive (*)      Acceptable Yes     POCT INFLUENZA A/B MOLECULAR    POC Molecular Influenza A Ag Negative      POC Molecular Influenza B Ag Negative       Acceptable Yes      POCT GLUCOSE    POCT Glucose 97            Imaging Results              X-Ray Chest AP Portable (Final result)  Result time 11/02/24 15:21:05      Final result by Greg Leblanc MD (11/02/24 15:21:05)                   Impression:      No acute cardiopulmonary process.      Electronically signed by: Greg Leblanc MD  Date:    11/02/2024  Time:    15:21               Narrative:    EXAMINATION:  XR CHEST AP PORTABLE    CLINICAL HISTORY:  COVID-19    COMPARISON:  05/01/2023    FINDINGS:  Cardiac silhouette and mediastinal contours are normal.  Lungs are clear.  Osseous structures are intact.                                       Medications   ketorolac injection 15 mg (15 mg Intramuscular Given 11/2/24 1501)     Medical Decision Making      64-year-old female with history of HTN, DM, arthritis, chronic low back pain presents for evaluation of fevers and body aches.  Initial onset yesterday, with associated malaise and fatigue.  She took her regular dose Percocet yesterday but symptoms persistent today, though she has not had a fever yet.  She also reports pain around her bilateral ribs but no associated SOB or wheezing, only with minimal cough.  She has had decreased appetite but no vomiting or diarrhea.  No known sick contacts.  On arrival patient afebrile with relatively normal vitals, normal O2 sat on room air.  On exam she has no focal bone or joint tenderness, no effusion and ROM intact, she was ambulatory without instability.  No focal rib or chest wall tenderness, normal lung exam.  Differential diagnosis includes COVID, flu, viral syndrome, exacerbation of chronic low back pain and arthritis.    Patient's rapid COVID was positive, which accounts for her symptoms.  Fingerstick normal.  Chest x-ray with no evidence for associated pneumonia, pleural effusion, or CHF per my independent interpretation.  Patient treated with IM Toradol with improvement of her body aches.  She remained afebrile with  normal O2 sat in ED, no indication for admission or further emergent workup.  I discussed with her risks and benefits of Paxlovid and she does not want to take it at this time, stating that she does not like taking pills.  She was advised on further supportive care including antipyretics, NSAIDs as needed for myalgias, and p.o. hydration.  She understands to return to the ED for any difficulty breathing or worsening symptoms.      Amount and/or Complexity of Data Reviewed  Labs: ordered.  Radiology: ordered.    Risk  Prescription drug management.                                      Clinical Impression:  Final diagnoses:  [U07.1] COVID-19 virus infection          ED Disposition Condition    Discharge Stable          ED Prescriptions    None       Follow-up Information       Follow up With Specialties Details Why Contact Info    [facility] - Emergency Dept Emergency Medicine Go to  If symptoms worsen [id number] [facility] [id number] [phone]             Abundio Lang MD  11/03/24 [id number]

## 2024-11-02 NOTE — ED TRIAGE NOTES
Pt presents to ED c/o generalized body aches. Endorses subjective fever and fatigue. Endorses cough and decreased appetite. Reports taking percocet and Nyquil. Denies SOB, vomiting or diarrhea. Aaox4, NAD noted.

## 2024-11-02 NOTE — FIRST PROVIDER EVALUATION
Emergency Department TeleTriage Encounter Note      CHIEF COMPLAINT    Chief Complaint   Patient presents with    Generalized Body Aches     Generalized body aches with subjective fever onset yesterday. Denies n/v/d/c, SOB, CP.        VITAL SIGNS   Initial Vitals [11/02/24 1403]   BP Pulse Resp Temp SpO2   (!) 147/107 72 16 98.7 °F (37.1 °C) 97 %      MAP       --            ALLERGIES    Review of patient's allergies indicates:   Allergen Reactions    Adhesive      Other reaction(s): BLISTERS    Latex      Added based on information entered during log entry, please review and add reactions, type, and severity as needed    Pramipexole Itching     Patient states it caused her to have severe itching    Silicone Itching    Silicones      Other reaction(s): BLISTERS    Adhesive tape-silicones Rash       PROVIDER TRIAGE NOTE  Patient presents with body aches, subjective fever, occasional cough and congestion. No GI symptoms.       ORDERS  Labs Reviewed   HEPATITIS C ANTIBODY   HIV 1 / 2 ANTIBODY       ED Orders (720h ago, onward)      Start Ordered     Status Ordering Provider    11/02/24 1406 11/02/24 1405  Hepatitis C Antibody  STAT         Ordered MALIK MORALES    11/02/24 1406 11/02/24 1405  HIV 1/2 Ag/Ab (4th Gen)  STAT         Ordered MALIK MORALES              Virtual Visit Note: The provider triage portion of this emergency department evaluation and documentation was performed via [employer], a HIPAA-compliant telemedicine application, in concert with a tele-presenter in the room. A face to face patient evaluation with one of my colleagues will occur once the patient is placed in an emergency department room.      DISCLAIMER: This note was prepared with [employer] voice recognition transcription software. Garbled syntax, mangled pronouns, and other bizarre constructions may be attributed to that software system.

## 2024-11-19 ENCOUNTER — TELEPHONE (OUTPATIENT)
Dept: OPHTHALMOLOGY | Facility: CLINIC | Age: 64
End: 2024-11-19
Payer: MEDICARE

## 2024-11-19 NOTE — TELEPHONE ENCOUNTER
"----- Message from Km Meza sent at 11/19/2024 10:30 AM CST -----  Regarding: RE: Referral  She was weird on the phone. Wanted  to see him asap  ----- Message -----  From: Maria Victoria Driver  Sent: 11/19/2024  10:24 AM CST  To: Susana Bone  Subject: RE: Referral                                     Her ears??? Or eyes???  ----- Message -----  From: Susana Bone  Sent: 11/19/2024  10:19 AM CST  To: Maria Victoria Driver  Subject: RE: Referral                                     I set her up, but now she wants to see Dr. Simpson because her ears drain everyday? Please schedule this one :) [phone] (Home Phone)  [phone] (Mobile)  ----- Message -----  From: Maria Victoria Driver  Sent: 11/19/2024   8:00 AM CST  To: [facility] Clinical Support Staff  Subject: FW: Referral                                     This pt just needs a diabetic eye exam appt with any optometrist please  ----- Message -----  From: Sara West  Sent: 11/18/2024   5:01 PM CST  To: Maria Victoria Driver  Subject: FW: Referral                                       ----- Message -----  From: Marysol Elaine  Sent: 11/18/2024   4:55 PM CST  To: [facility] Staff  Subject: Referral                                         Good afternoon,    Provider Jose Rose would like to refer the patient to Dr. Brandie Simpson in the Oph/Opt department.        The patients diagnosis is:  Type 2 diabetes mellitus with retinopathy without macular edema     The order has been entered into [facility] from Care Everywhere (see "active request" tab).      Please review and contact patient to schedule appointment. If there are no appointments available at this time, please advise and I will inform the referring provider/clinic      Thank you,   Marysol   [facility]  "

## 2024-12-01 ENCOUNTER — HOSPITAL ENCOUNTER (EMERGENCY)
Facility: OTHER | Age: 64
Discharge: HOME | End: 2024-12-01
Attending: EMERGENCY MEDICINE
Payer: MEDICARE

## 2024-12-01 VITALS
TEMPERATURE: 98 F | SYSTOLIC BLOOD PRESSURE: 165 MMHG | DIASTOLIC BLOOD PRESSURE: 88 MMHG | HEIGHT: 65 IN | OXYGEN SATURATION: 98 % | BODY MASS INDEX: 39.65 KG/M2 | HEART RATE: 79 BPM | WEIGHT: 238 LBS | RESPIRATION RATE: 18 BRPM

## 2024-12-01 DIAGNOSIS — I10 HYPERTENSION, UNSPECIFIED TYPE: ICD-10-CM

## 2024-12-01 DIAGNOSIS — H60.502 ACUTE OTITIS EXTERNA OF LEFT EAR, UNSPECIFIED TYPE: Primary | ICD-10-CM

## 2024-12-01 DIAGNOSIS — H66.90 OTITIS MEDIA, UNSPECIFIED LATERALITY, UNSPECIFIED OTITIS MEDIA TYPE: ICD-10-CM

## 2024-12-01 DIAGNOSIS — H92.12 OTORRHEA OF LEFT EAR: ICD-10-CM

## 2024-12-01 PROCEDURE — 25000003 PHARM REV CODE 250: Performed by: EMERGENCY MEDICINE

## 2024-12-01 PROCEDURE — 99284 EMERGENCY DEPT VISIT MOD MDM: CPT

## 2024-12-01 RX ORDER — AMOXICILLIN 400 MG/5ML
400 POWDER, FOR SUSPENSION ORAL 2 TIMES DAILY
Qty: 70 ML | Refills: 0 | Status: SHIPPED | OUTPATIENT
Start: 2024-12-01 | End: 2024-12-08

## 2024-12-01 RX ORDER — AMOXICILLIN AND CLAVULANATE POTASSIUM 875; 125 MG/1; MG/1
1 TABLET, FILM COATED ORAL
Status: DISCONTINUED | OUTPATIENT
Start: 2024-12-01 | End: 2024-12-01

## 2024-12-01 RX ORDER — AMOXICILLIN AND CLAVULANATE POTASSIUM 875; 125 MG/1; MG/1
1 TABLET, FILM COATED ORAL 2 TIMES DAILY
Qty: 14 TABLET | Refills: 0 | Status: SHIPPED | OUTPATIENT
Start: 2024-12-01 | End: 2024-12-01 | Stop reason: SDUPTHER

## 2024-12-01 RX ORDER — CIPROFLOXACIN AND DEXAMETHASONE 3; 1 MG/ML; MG/ML
4 SUSPENSION/ DROPS AURICULAR (OTIC) 2 TIMES DAILY
Qty: 7.5 ML | Refills: 0 | Status: SHIPPED | OUTPATIENT
Start: 2024-12-01

## 2024-12-01 RX ORDER — AMOXICILLIN AND CLAVULANATE POTASSIUM 400; 57 MG/5ML; MG/5ML
400 POWDER, FOR SUSPENSION ORAL
Status: COMPLETED | OUTPATIENT
Start: 2024-12-01 | End: 2024-12-01

## 2024-12-01 RX ORDER — NEOMYCIN SULFATE, POLYMYXIN B SULFATE, HYDROCORTISONE 3.5; 10000; 1 MG/ML; [USP'U]/ML; MG/ML
3 SOLUTION/ DROPS AURICULAR (OTIC) EVERY 6 HOURS
Status: DISCONTINUED | OUTPATIENT
Start: 2024-12-01 | End: 2024-12-01 | Stop reason: HOSPADM

## 2024-12-01 RX ADMIN — NEOMYCIN SULFATE, POLYMYXIN B SULFATE, HYDROCORTISONE 3 DROP: 3.5; 10000; 1 SOLUTION/ DROPS AURICULAR (OTIC) at 03:12

## 2024-12-01 RX ADMIN — AMOXICILLIN AND CLAVULANATE POTASSIUM 400 MG: 400; 57 POWDER, FOR SUSPENSION ORAL at 03:12

## 2024-12-01 NOTE — ED PROVIDER NOTES
Encounter Date: 12/1/2024       History     Chief Complaint   Patient presents with    Otalgia     Pt reports L ear pain with drainage x1 week     This is a very pleasant 64-year-old female with a past medical history significant for to placement in the left ear presenting with Drainage out of the left ear over last week roughly.  She notes that she has been placing paper towels in the ear to absorb the drainage has continued to have the drainage there since.  Has not taken anything to try and help with this specifically, can not recall anything specifically like this in the past.    The history is provided by the patient and medical records.     Review of patient's allergies indicates:   Allergen Reactions    Adhesive      Other reaction(s): BLISTERS    Latex      Added based on information entered during log entry, please review and add reactions, type, and severity as needed    Pramipexole Itching     Patient states it caused her to have severe itching    Silicone Itching    Silicones      Other reaction(s): BLISTERS    Adhesive tape-silicones Rash     Past Medical History:   Diagnosis Date    Essential (primary) hypertension     Legally blind in left eye, as defined in [place]     Type 2 diabetes mellitus with unspecified diabetic retinopathy without macular edema      Past Surgical History:   Procedure Laterality Date    GALLBLADDER SURGERY      MT EVAL,SWALLOW FUNCTION,CINE/VIDEO RECORD  8/26/2021         REVISION OF TOTAL KNEE REPLACEMENT        Family History   Problem Relation Name Age of Onset    Colon cancer Brother      Breast cancer Maternal Aunt      Ovarian cancer Neg Hx       Social History     Tobacco Use    Smoking status: Never    Smokeless tobacco: Never   Substance Use Topics    Alcohol use: Never    Drug use: Never     Review of Systems  Constitutional-no fever  HEENT-positive ear drainage  Eyes-no redness  Respiratory-no shortness of breath  Cardio-no chest pain  GI-no abdominal pain  Endocrine-no  cold intolerance  -no difficulty urinating  MSK-no myalgias  Skin-no rashes  Allergy-no environmental allergy  Neurologic-, no headache  Hematology-no swollen nodes  Behavioral-no confusion  Physical Exam     Initial Vitals   BP Pulse Resp Temp SpO2   12/01/24 1446 12/01/24 1446 12/01/24 1446 12/01/24 1446 12/01/24 1550   (!) 192/94 78 18 98.1 °F (36.7 °C) 98 %      MAP       --                Physical Exam  Constitutional:  Generally well-appearing 64-year-old female in no obvious distress  Eyes:  Chronic asymmetry in the face, slightly prominent left frontal forehead,  ENT       Head: Normocephalic, atraumatic.       Nose: Normal external appearance        Mouth/Throat: no strigulous respirations   Ears-the left external auditory canal demonstrates active purulent drainage, erythematous and bulging tympanic membrane  Hematological/Lymphatic/Immunilogical: no visible lymphadenopathy   Cardiovascular: Normal rate,   Respiratory: Normal respiratory effort.   Gastrointestinal: non distended   Musculoskeletal: Normal range of motion in all extremities. No obvious deformities or swelling.  Neurologic: Alert, oriented. Normal speech and language. No gross focal neurologic deficits are appreciated.  Skin: Skin is warm, dry. No rash noted.  Psychiatric: Mood and affect are normal.   ED Course   Procedures  Labs Reviewed - No data to display       Imaging Results    None          Medications   neomycin-polymyxin-hydrocortisone otic solution 3 drop (3 drops Left Ear Given 12/1/24 1524)   amoxicillin-clavulanate 400-57 mg/5 mL suspension 400 mg (400 mg Oral Given 12/1/24 1548)     Medical Decision Making  Differential diagnosis-mastoiditis, otitis media, otitis externa, malignant otitis   No pain with manipulation of the external ear.  Low suspicion for mastoiditis or chondritis at this time.    Plan for treatment of internal and external ear infection.  Will plan for outpatient follow-up returning case of worsening of  symptoms.  Encouraged blood pressure recheck and resumption of her normal medications in the home setting.    Problems Addressed:  Acute otitis externa of left ear, unspecified type: acute illness or injury  Hypertension, unspecified type: chronic illness or injury with exacerbation, progression, or side effects of treatment  Otitis media, unspecified laterality, unspecified otitis media type: acute illness or injury  Otorrhea of left ear: acute illness or injury    Amount and/or Complexity of Data Reviewed  External Data Reviewed: labs and notes.     Details: History of previous colonoscopy    Risk  OTC drugs.  Prescription drug management.                                      Clinical Impression:  Final diagnoses:  [H60.502] Acute otitis externa of left ear, unspecified type (Primary)  [H66.90] Otitis media, unspecified laterality, unspecified otitis media type  [H92.12] Otorrhea of left ear  [I10] Hypertension, unspecified type          ED Disposition Condition    Discharge Stable          ED Prescriptions       Medication Sig Dispense Start Date End Date Auth. Provider    amoxicillin-clavulanate 875-125mg (AUGMENTIN) 875-125 mg per tablet  (Status: Discontinued) Take 1 tablet by mouth 2 (two) times daily. 14 tablet 12/1/2024 12/1/2024 Silviano Giles MD    ciprofloxacin-dexAMETHasone 0.3-0.1% (CIPRODEX) 0.3-0.1 % DrpS Place 4 drops into both ears 2 (two) times daily. 7.5 mL 12/1/2024 -- Silviano Giles MD    amoxicillin (AMOXIL) 400 mg/5 mL suspension Take 5 mLs (400 mg total) by mouth 2 (two) times daily. for 7 days 70 mL 12/1/2024 12/8/2024 Silviano Giles MD          Follow-up Information       Follow up With Specialties Details Why Contact Info    Giovani Espinal MD Otolaryngology Schedule an appointment as soon as possible for a visit  As needed, For a follow up visit about today [facility] [facility] [address]  [phone]               Silviano Giles MD  12/01/24 [id number]

## 2024-12-01 NOTE — DISCHARGE INSTRUCTIONS
Mrs. De Oliveira,    Thank you for letting me care for you today! It was nice meeting you, and I hope you feel better soon.   If you would like access to your chart and what was done today please utilize the [facility].   Please come back to [facility] for all of your future medical needs.    Our goal in the emergency department is to always give you outstanding care and exceptional service. You may receive a survey by mail or e-mail in the next week regarding your experience in our ED. We would greatly appreciate you completing and returning the survey. Your feedback provides us with a way to recognize our staff who give very good care and it helps us learn how to improve when your experience was below our aspiration of excellence.     Sincerely,    Silviano Giles MD  Board Certified Emergency Physician

## 2024-12-02 ENCOUNTER — OFFICE VISIT (OUTPATIENT)
Dept: OTOLARYNGOLOGY | Facility: CLINIC | Age: 64
End: 2024-12-02
Payer: MEDICARE

## 2024-12-02 VITALS
WEIGHT: 246.69 LBS | SYSTOLIC BLOOD PRESSURE: 156 MMHG | DIASTOLIC BLOOD PRESSURE: 71 MMHG | BODY MASS INDEX: 41.05 KG/M2 | OXYGEN SATURATION: 96 % | HEART RATE: 63 BPM

## 2024-12-02 DIAGNOSIS — H93.A2 PULSATILE TINNITUS, LEFT EAR: ICD-10-CM

## 2024-12-02 DIAGNOSIS — H90.A32 MIXED CONDUCTIVE AND SENSORINEURAL HEARING LOSS OF LEFT EAR WITH RESTRICTED HEARING OF RIGHT EAR: ICD-10-CM

## 2024-12-02 DIAGNOSIS — H92.12 CHRONIC OTORRHEA OF LEFT EAR: Primary | ICD-10-CM

## 2024-12-02 DIAGNOSIS — J30.9 ALLERGIC RHINITIS, UNSPECIFIED SEASONALITY, UNSPECIFIED TRIGGER: ICD-10-CM

## 2024-12-02 DIAGNOSIS — H60.502 ACUTE OTITIS EXTERNA OF LEFT EAR, UNSPECIFIED TYPE: ICD-10-CM

## 2024-12-02 DIAGNOSIS — H54.40 BLINDNESS OF LEFT EYE WITH NORMAL VISION IN CONTRALATERAL EYE: ICD-10-CM

## 2024-12-02 DIAGNOSIS — J34.2 NASAL SEPTAL DEVIATION: ICD-10-CM

## 2024-12-02 PROCEDURE — 3077F SYST BP >= 140 MM HG: CPT | Mod: CPTII,S$GLB,, | Performed by: SPECIALIST

## 2024-12-02 PROCEDURE — 1159F MED LIST DOCD IN RCRD: CPT | Mod: CPTII,S$GLB,, | Performed by: SPECIALIST

## 2024-12-02 PROCEDURE — 99214 OFFICE O/P EST MOD 30 MIN: CPT | Mod: S$GLB,,, | Performed by: SPECIALIST

## 2024-12-02 PROCEDURE — 87186 SC STD MICRODIL/AGAR DIL: CPT | Performed by: SPECIALIST

## 2024-12-02 PROCEDURE — 87070 CULTURE OTHR SPECIMN AEROBIC: CPT | Performed by: SPECIALIST

## 2024-12-02 PROCEDURE — 1101F PT FALLS ASSESS-DOCD LE1/YR: CPT | Mod: CPTII,S$GLB,, | Performed by: SPECIALIST

## 2024-12-02 PROCEDURE — 99999 PR PBB SHADOW E&M-EST. PATIENT-LVL V: CPT | Mod: PBBFAC,,, | Performed by: SPECIALIST

## 2024-12-02 PROCEDURE — 3078F DIAST BP <80 MM HG: CPT | Mod: CPTII,S$GLB,, | Performed by: SPECIALIST

## 2024-12-02 PROCEDURE — 3288F FALL RISK ASSESSMENT DOCD: CPT | Mod: CPTII,S$GLB,, | Performed by: SPECIALIST

## 2024-12-02 PROCEDURE — 4010F ACE/ARB THERAPY RXD/TAKEN: CPT | Mod: CPTII,S$GLB,, | Performed by: SPECIALIST

## 2024-12-02 PROCEDURE — 1160F RVW MEDS BY RX/DR IN RCRD: CPT | Mod: CPTII,S$GLB,, | Performed by: SPECIALIST

## 2024-12-02 PROCEDURE — 1126F AMNT PAIN NOTED NONE PRSNT: CPT | Mod: CPTII,S$GLB,, | Performed by: SPECIALIST

## 2024-12-02 PROCEDURE — 87077 CULTURE AEROBIC IDENTIFY: CPT | Performed by: SPECIALIST

## 2024-12-02 PROCEDURE — 3008F BODY MASS INDEX DOCD: CPT | Mod: CPTII,S$GLB,, | Performed by: SPECIALIST

## 2024-12-02 NOTE — PROGRESS NOTES
Subjective:       Patient ID: Estrella De Oliveira is a 64 y.o. female.    Chief Complaint: No chief complaint on file.      The patient is referred by Dr. Silviano Giles for evaluation of left otorrhea.  She had a tube placed in her left ear on February 26, 2024 in an office procedure.  She had chronic serous otitis media prior to that.  Since having the tube placed she claims to have otorrhea almost continuously.  The drainage generally is clear but has become yellow for the last 2 weeks.  She is having associated pulsatile tinnitus when her ears filled with fluid.  She has difficulty swallowing and pill dysphagia.  She has been placed on Augmentin liquid in Ciprodex, which she just started this morning.          Review of Systems     Constitutional: Negative for appetite change, chills, fatigue, fever and unexpected weight loss.      HENT: Positive for ear discharge, ear infection, ear pain, hearing loss and postnasal drip.  Negative for facial swelling, mouth sores, nosebleeds, ringing in the ears, runny nose, sinus infection, sinus pressure, sore throat, stuffy nose, tonsil infection, dental problems, trouble swallowing and voice change.      Eyes:  Positive for change in eyesight (blindness left eye). Negative for eye drainage, eye itching and photophobia.     Respiratory:  Negative for cough, shortness of breath, sleep apnea, snoring and wheezing.      Cardiovascular:  Negative for chest pain, foot swelling, irregular heartbeat and swollen veins.     Gastrointestinal:  Positive for acid reflux and heartburn. Negative for abdominal pain, constipation, diarrhea and vomiting.     Genitourinary: Positive for frequent urination. Negative for difficulty urinating and sexual problems.     Musc: Positive for aching joints, aching muscles, back pain and neck pain. Lymphedema of left lower extremity    Skin: Negative for rash.     Allergy: Negative for food allergies and seasonal allergies.     Endocrine: Negative for  cold intolerance and heat intolerance.  Diabetes mellitus, type 2  Morbid obesity    Neurological: Negative for dizziness, headaches, light-headedness, seizures and tremors.  Meningioma of optic nerve sheath    Hematologic: Negative for bruises/bleeds easily and swollen glands.      Psychiatric: Positive for depression, nervous/anxious and sleep disturbance. Negative for decreased concentration.                Objective:      Physical Exam  Vitals and nursing note reviewed.   Constitutional:       General: She is awake.      Appearance: Normal appearance. She is well-developed and well-groomed. She is morbidly obese.   HENT:      Head: Normocephalic.      Jaw: There is normal jaw occlusion.      Salivary Glands: Right salivary gland is not diffusely enlarged or tender. Left salivary gland is not diffusely enlarged or tender.      Right Ear: Hearing, ear canal and external ear normal. Tympanic membrane is retracted. Tympanic membrane has decreased mobility.      Left Ear: Hearing, ear canal and external ear normal. Drainage (thin yellow pus in ear canal) present. A PE tube is present. Tympanic membrane is retracted.      Nose: Septal deviation (septum deviated to the right), mucosal edema (cyanotic, boggy inferior turbinates bilaterally) and rhinorrhea (clear mucus bilaterally) present. No nasal deformity. Rhinorrhea is clear.      Right Turbinates: Enlarged and pale.      Left Turbinates: Enlarged and pale.      Mouth/Throat:      Lips: No lesions.      Mouth: Mucous membranes are moist. No oral lesions.      Dentition: Abnormal dentition. No gum lesions.      Tongue: No lesions.      Palate: No mass and lesions.      Pharynx: Oropharynx is clear. Uvula midline.      Tonsils: 2+ on the right. 2+ on the left.   Eyes:      General: Lids are normal.         Right eye: No discharge.         Left eye: No discharge.      Extraocular Movements: Extraocular movements intact.      Conjunctiva/sclera: Conjunctivae normal.       Pupils: Pupils are equal, round, and reactive to light.      Comments: Left eye-blind   Neck:      Thyroid: No thyroid mass or thyromegaly.      Trachea: Trachea normal. No tracheal deviation.   Cardiovascular:      Rate and Rhythm: Normal rate and regular rhythm.      Pulses: Normal pulses.      Heart sounds: Normal heart sounds.   Pulmonary:      Effort: Pulmonary effort is normal.      Breath sounds: Normal breath sounds. No stridor. No decreased breath sounds, wheezing, rhonchi or rales.   Abdominal:      General: Bowel sounds are normal.      Palpations: Abdomen is soft.      Tenderness: There is no abdominal tenderness.   Musculoskeletal:         General: Normal range of motion.      Cervical back: Normal range of motion. No muscular tenderness.   Lymphadenopathy:      Head:      Right side of head: No submental, submandibular, preauricular, posterior auricular or occipital adenopathy.      Left side of head: No submental, submandibular, preauricular, posterior auricular or occipital adenopathy.      Cervical: No cervical adenopathy.   Skin:     General: Skin is warm and dry.      Findings: No petechiae or rash.      Nails: There is no clubbing.   Neurological:      Mental Status: She is alert and oriented to person, place, and time.      Cranial Nerves: No cranial nerve deficit.      Sensory: No sensory deficit.      Gait: Gait normal.   Psychiatric:         Speech: Speech normal.         Behavior: Behavior normal. Behavior is cooperative.         Thought Content: Thought content normal.         Judgment: Judgment normal.         Assessment:       1. Chronic otorrhea of left ear    2. Acute otitis externa of left ear, unspecified type    3. Allergic rhinitis, unspecified seasonality, unspecified trigger    4. Nasal septal deviation    5. Mixed conductive and sensorineural hearing loss of left ear with restricted hearing of right ear    6. Pulsatile tinnitus, left ear        Plan:       I am performed direct  ear culture for aerobic bacteria culture and sensitivity.  Will have the patient continue with her Augmentin and Ciprodex drops.  I will change antibiotics on basis of sensitivity from cultures.  I will recheck her in 10 days.                  DISCLAIMER: This note was prepared with [employer] voice recognition transcription software. Garbled syntax, mangled pronouns, and other bizarre constructions may be attributed to that software system. While efforts were made to correct any mistakes made by this voice recognition program, some errors and/or omissions may remain in the note that were missed when the note was originally created.

## 2024-12-05 LAB
BACTERIA SPEC AEROBE CULT: ABNORMAL
BACTERIA SPEC AEROBE CULT: ABNORMAL

## 2024-12-05 RX ORDER — DOXYCYCLINE HYCLATE 100 MG
100 TABLET ORAL 2 TIMES DAILY
Qty: 20 TABLET | Refills: 0 | Status: SHIPPED | OUTPATIENT
Start: 2024-12-05

## 2024-12-06 ENCOUNTER — TELEPHONE (OUTPATIENT)
Dept: OTOLARYNGOLOGY | Facility: CLINIC | Age: 64
End: 2024-12-06
Payer: MEDICARE

## 2024-12-06 NOTE — TELEPHONE ENCOUNTER
Dr. Flood said that culture grew out 2 different organisms.  The eardrops will cover 1.  He called out a prescription for Doxycycline,an antibiotic, for the 2nd infection.  He sent it to [facility] drugstore [facility] [address]. Pt was concerned about the size of the pill and swallowing informed pt that it is not a big pill. Nurse preceded to ask pt about her ear drainage. Pt said that she woke up in the morning and has less drainage and is better. Asked to call the office or send message through the portal if she has any questions or if she starts feeling worse.

## 2024-12-12 ENCOUNTER — OFFICE VISIT (OUTPATIENT)
Dept: OTOLARYNGOLOGY | Facility: CLINIC | Age: 64
End: 2024-12-12
Payer: MEDICARE

## 2024-12-12 VITALS
WEIGHT: 238.13 LBS | BODY MASS INDEX: 39.62 KG/M2 | OXYGEN SATURATION: 99 % | SYSTOLIC BLOOD PRESSURE: 137 MMHG | HEART RATE: 65 BPM | DIASTOLIC BLOOD PRESSURE: 83 MMHG

## 2024-12-12 DIAGNOSIS — H92.12 CHRONIC OTORRHEA OF LEFT EAR: Primary | ICD-10-CM

## 2024-12-12 DIAGNOSIS — J30.9 ALLERGIC RHINITIS, UNSPECIFIED SEASONALITY, UNSPECIFIED TRIGGER: ICD-10-CM

## 2024-12-12 DIAGNOSIS — J34.2 NASAL SEPTAL DEVIATION: ICD-10-CM

## 2024-12-12 DIAGNOSIS — H93.A2 PULSATILE TINNITUS, LEFT EAR: ICD-10-CM

## 2024-12-12 DIAGNOSIS — H90.A32 MIXED CONDUCTIVE AND SENSORINEURAL HEARING LOSS OF LEFT EAR WITH RESTRICTED HEARING OF RIGHT EAR: ICD-10-CM

## 2024-12-12 PROCEDURE — 1101F PT FALLS ASSESS-DOCD LE1/YR: CPT | Mod: CPTII,S$GLB,, | Performed by: SPECIALIST

## 2024-12-12 PROCEDURE — 1159F MED LIST DOCD IN RCRD: CPT | Mod: CPTII,S$GLB,, | Performed by: SPECIALIST

## 2024-12-12 PROCEDURE — 4010F ACE/ARB THERAPY RXD/TAKEN: CPT | Mod: CPTII,S$GLB,, | Performed by: SPECIALIST

## 2024-12-12 PROCEDURE — 1126F AMNT PAIN NOTED NONE PRSNT: CPT | Mod: CPTII,S$GLB,, | Performed by: SPECIALIST

## 2024-12-12 PROCEDURE — 1160F RVW MEDS BY RX/DR IN RCRD: CPT | Mod: CPTII,S$GLB,, | Performed by: SPECIALIST

## 2024-12-12 PROCEDURE — 3288F FALL RISK ASSESSMENT DOCD: CPT | Mod: CPTII,S$GLB,, | Performed by: SPECIALIST

## 2024-12-12 PROCEDURE — 3008F BODY MASS INDEX DOCD: CPT | Mod: CPTII,S$GLB,, | Performed by: SPECIALIST

## 2024-12-12 PROCEDURE — 3075F SYST BP GE 130 - 139MM HG: CPT | Mod: CPTII,S$GLB,, | Performed by: SPECIALIST

## 2024-12-12 PROCEDURE — 99999 PR PBB SHADOW E&M-EST. PATIENT-LVL III: CPT | Mod: PBBFAC,,, | Performed by: SPECIALIST

## 2024-12-12 PROCEDURE — 3079F DIAST BP 80-89 MM HG: CPT | Mod: CPTII,S$GLB,, | Performed by: SPECIALIST

## 2024-12-12 PROCEDURE — 99214 OFFICE O/P EST MOD 30 MIN: CPT | Mod: S$GLB,,, | Performed by: SPECIALIST

## 2024-12-12 RX ORDER — DOXYCYCLINE HYCLATE 100 MG
100 TABLET ORAL 2 TIMES DAILY
Qty: 20 TABLET | Refills: 0 | Status: SHIPPED | OUTPATIENT
Start: 2024-12-12

## 2024-12-12 RX ORDER — CIPROFLOXACIN 500 MG/1
500 TABLET ORAL 2 TIMES DAILY
Qty: 20 TABLET | Refills: 0 | Status: SHIPPED | OUTPATIENT
Start: 2024-12-12 | End: 2024-12-22

## 2024-12-12 NOTE — PROGRESS NOTES
Subjective:       Patient ID: Estrella De Oliveira is a 64 y.o. female.    Chief Complaint: No chief complaint on file.      The patient is returning for follow-up visit.  It has been 10 days since I last saw her.  There are multiple issues to discuss:  1. Chronic otorrhea left ear:  The drainage in the patient's left ear continues.  It is less thick and has gone from yellow to clear.  I started her on doxycycline when the the results of the culturing of her left ear at last visit returned.  She has been taking the medication for 6 days.  She has been using Ciprodex drops the entire time since her last visit.  Culture returned with Pseudomonas and staph aureus.  2. Allergic rhinitis:  Nasal secretions are clear.  The patient is using mometasone nasal spray.  3. Mixed conductive and sensorineural hearing loss left ear with restricted hearing right:  There has been no change in the patient's hearing.    4. Pulsatile tinnitus the patient continues to have pulsatile tinnitus on the left.            Review of Systems     Constitutional: Negative for appetite change, chills, fatigue, fever and unexpected weight loss.      HENT: Positive for ear discharge, ear infection, ear pain, hearing loss and postnasal drip.  Negative for facial swelling, mouth sores, nosebleeds, ringing in the ears, runny nose, sinus infection, sinus pressure, sore throat, stuffy nose, tonsil infection, dental problems, trouble swallowing and voice change.      Eyes:  Positive for change in eyesight (blindness left eye). Negative for eye drainage, eye itching and photophobia.     Respiratory:  Negative for cough, shortness of breath, sleep apnea, snoring and wheezing.      Cardiovascular:  Negative for chest pain, foot swelling, irregular heartbeat and swollen veins.     Gastrointestinal:  Positive for acid reflux and heartburn. Negative for abdominal pain, constipation, diarrhea and vomiting.     Genitourinary: Positive for frequent urination.  Negative for difficulty urinating and sexual problems.     Musc: Positive for aching joints, aching muscles, back pain and neck pain. Lymphedema of left lower extremity    Skin: Negative for rash.     Allergy: Negative for food allergies and seasonal allergies.     Endocrine: Negative for cold intolerance and heat intolerance.  Diabetes mellitus, type 2  Morbid obesity    Neurological: Negative for dizziness, headaches, light-headedness, seizures and tremors.  Meningioma of optic nerve sheath    Hematologic: Negative for bruises/bleeds easily and swollen glands.      Psychiatric: Positive for depression, nervous/anxious and sleep disturbance. Negative for decreased concentration.                Objective:      Physical Exam  Vitals and nursing note reviewed.   Constitutional:       General: She is awake.      Appearance: Normal appearance. She is well-developed and well-groomed. She is morbidly obese.   HENT:      Head: Normocephalic.      Jaw: There is normal jaw occlusion.      Salivary Glands: Right salivary gland is not diffusely enlarged or tender. Left salivary gland is not diffusely enlarged or tender.      Right Ear: Hearing, ear canal and external ear normal. Tympanic membrane is retracted. Tympanic membrane has decreased mobility.      Left Ear: Hearing, ear canal and external ear normal. Drainage (thin yellow pus in ear canal) present. A PE tube is present. Tympanic membrane is retracted.      Nose: Septal deviation (septum deviated to the right), mucosal edema (cyanotic, boggy inferior turbinates bilaterally) and rhinorrhea (clear mucus bilaterally) present. No nasal deformity. Rhinorrhea is clear.      Right Turbinates: Enlarged and pale.      Left Turbinates: Enlarged and pale.      Mouth/Throat:      Lips: No lesions.      Mouth: Mucous membranes are moist. No oral lesions.      Dentition: Abnormal dentition. No gum lesions.      Tongue: No lesions.      Palate: No mass and lesions.      Pharynx:  Oropharynx is clear. Uvula midline.      Tonsils: 2+ on the right. 2+ on the left.   Eyes:      General: Lids are normal.         Right eye: No discharge.         Left eye: No discharge.      Extraocular Movements: Extraocular movements intact.      Conjunctiva/sclera: Conjunctivae normal.      Pupils: Pupils are equal, round, and reactive to light.      Comments: Left eye-blind   Neck:      Thyroid: No thyroid mass or thyromegaly.      Trachea: Trachea normal. No tracheal deviation.   Cardiovascular:      Rate and Rhythm: Normal rate and regular rhythm.      Pulses: Normal pulses.      Heart sounds: Normal heart sounds.   Pulmonary:      Effort: Pulmonary effort is normal.      Breath sounds: Normal breath sounds. No stridor. No decreased breath sounds, wheezing, rhonchi or rales.   Abdominal:      General: Bowel sounds are normal.      Palpations: Abdomen is soft.      Tenderness: There is no abdominal tenderness.   Musculoskeletal:         General: Normal range of motion.      Cervical back: Normal range of motion. No muscular tenderness.   Lymphadenopathy:      Head:      Right side of head: No submental, submandibular, preauricular, posterior auricular or occipital adenopathy.      Left side of head: No submental, submandibular, preauricular, posterior auricular or occipital adenopathy.      Cervical: No cervical adenopathy.   Skin:     General: Skin is warm and dry.      Findings: No petechiae or rash.      Nails: There is no clubbing.   Neurological:      Mental Status: She is alert and oriented to person, place, and time.      Cranial Nerves: No cranial nerve deficit.      Sensory: No sensory deficit.      Gait: Gait normal.   Psychiatric:         Speech: Speech normal.         Behavior: Behavior normal. Behavior is cooperative.         Thought Content: Thought content normal.         Judgment: Judgment normal.         Results of veer cultures performed at last visit below:      Component 10 d ago   Aerobic  Bacterial Culture  Abnormal   STAPHYLOCOCCUS AUREUS  Many    Aerobic Bacterial Culture  Abnormal   PSEUDOMONAS AERUGINOSA  Rare    Resulting Agency [facility]        Susceptibility     Staphylococcus aureus Pseudomonas aeruginosa     CULTURE, AEROBIC  (SPECIFY SOURCE) CULTURE, AEROBIC  (SPECIFY SOURCE)     Cefepime   <=2 mcg/mL Sensitive     Ciprofloxacin   <=0.25 mcg/mL Sensitive     Clindamycin 2 mcg/mL Intermediate       Erythromycin <=0.5 mcg/mL Sensitive       Levofloxacin   <=0.5 mcg/mL Sensitive     Meropenem   <=1 mcg/mL Sensitive     Oxacillin <=0.25 mcg/mL Sensitive       Piperacillin/Tazo   <=8 mcg/mL Sensitive     Tetracycline <=4 mcg/mL Sensitive       Trimeth/Sulfa <=0.5/9.5 m... Sensitive                         Assessment:       1. Chronic otorrhea of left ear    2. Allergic rhinitis, unspecified seasonality, unspecified trigger    3. Pulsatile tinnitus, left ear    4. Mixed conductive and sensorineural hearing loss of left ear with restricted hearing of right ear    5. Nasal septal deviation        Plan:       I will have the patient continue using the Ciprodex drops.  I am placing her on oral Cipro in addition and will continue her doxycycline.  I will recheck her in 3 weeks.  At the end of the antibiotics I will have the patient continue using the Ciprodex drops until she recheck with me.  She will continue with water precautions to the ear.                DISCLAIMER: This note was prepared with [employer] voice recognition transcription software. Garbled syntax, mangled pronouns, and other bizarre constructions may be attributed to that software system. While efforts were made to correct any mistakes made by this voice recognition program, some errors and/or omissions may remain in the note that were missed when the note was originally created.

## 2024-12-26 ENCOUNTER — OFFICE VISIT (OUTPATIENT)
Dept: OPTOMETRY | Facility: CLINIC | Age: 64
End: 2024-12-26
Payer: MEDICARE

## 2024-12-26 DIAGNOSIS — Z96.1 PSEUDOPHAKIA, RIGHT EYE: ICD-10-CM

## 2024-12-26 DIAGNOSIS — D32.0 MENINGIOMA OF OPTIC NERVE SHEATH: ICD-10-CM

## 2024-12-26 DIAGNOSIS — H54.40 BLINDNESS OF LEFT EYE WITH NORMAL VISION IN CONTRALATERAL EYE: ICD-10-CM

## 2024-12-26 DIAGNOSIS — E11.9 TYPE 2 DIABETES MELLITUS WITHOUT RETINOPATHY: Primary | ICD-10-CM

## 2024-12-26 DIAGNOSIS — H25.812 COMBINED FORMS OF AGE-RELATED CATARACT OF LEFT EYE: ICD-10-CM

## 2024-12-26 DIAGNOSIS — H05.20 OCULAR PROPTOSIS: ICD-10-CM

## 2024-12-26 PROCEDURE — 99999 PR PBB SHADOW E&M-EST. PATIENT-LVL IV: CPT | Mod: PBBFAC,,, | Performed by: OPTOMETRIST

## 2024-12-26 PROCEDURE — 3288F FALL RISK ASSESSMENT DOCD: CPT | Mod: CPTII,S$GLB,, | Performed by: OPTOMETRIST

## 2024-12-26 PROCEDURE — 99214 OFFICE O/P EST MOD 30 MIN: CPT | Mod: S$GLB,,, | Performed by: OPTOMETRIST

## 2024-12-26 PROCEDURE — 92015 DETERMINE REFRACTIVE STATE: CPT | Mod: S$GLB,,, | Performed by: OPTOMETRIST

## 2024-12-26 PROCEDURE — 1126F AMNT PAIN NOTED NONE PRSNT: CPT | Mod: CPTII,S$GLB,, | Performed by: OPTOMETRIST

## 2024-12-26 PROCEDURE — 1101F PT FALLS ASSESS-DOCD LE1/YR: CPT | Mod: CPTII,S$GLB,, | Performed by: OPTOMETRIST

## 2024-12-26 RX ORDER — NEOMYCIN SULFATE, POLYMYXIN B SULFATE AND DEXAMETHASONE 3.5; 10000; 1 MG/ML; [USP'U]/ML; MG/ML
1 SUSPENSION/ DROPS OPHTHALMIC 4 TIMES DAILY
Qty: 5 ML | Refills: 1 | Status: SHIPPED | OUTPATIENT
Start: 2024-12-26

## 2024-12-28 ENCOUNTER — OFFICE VISIT (OUTPATIENT)
Dept: URGENT CARE | Facility: CLINIC | Age: 64
End: 2024-12-28
Payer: MEDICARE

## 2024-12-28 VITALS
HEART RATE: 68 BPM | TEMPERATURE: 98 F | WEIGHT: 238 LBS | SYSTOLIC BLOOD PRESSURE: 134 MMHG | DIASTOLIC BLOOD PRESSURE: 83 MMHG | OXYGEN SATURATION: 98 % | RESPIRATION RATE: 16 BRPM | HEIGHT: 65 IN | BODY MASS INDEX: 39.65 KG/M2

## 2024-12-28 DIAGNOSIS — J02.9 SORE THROAT: ICD-10-CM

## 2024-12-28 DIAGNOSIS — J02.0 STREP PHARYNGITIS: Primary | ICD-10-CM

## 2024-12-28 LAB
CTP QC/QA: YES
CTP QC/QA: YES
MOLECULAR STREP A: POSITIVE
POC MOLECULAR INFLUENZA A AGN: NEGATIVE
POC MOLECULAR INFLUENZA B AGN: NEGATIVE

## 2024-12-28 PROCEDURE — 87651 STREP A DNA AMP PROBE: CPT | Mod: QW,S$GLB,,

## 2024-12-28 PROCEDURE — 99204 OFFICE O/P NEW MOD 45 MIN: CPT | Mod: S$GLB,,,

## 2024-12-28 PROCEDURE — 87502 INFLUENZA DNA AMP PROBE: CPT | Mod: QW,S$GLB,,

## 2024-12-28 RX ORDER — AMOXICILLIN 500 MG/1
500 TABLET, FILM COATED ORAL EVERY 12 HOURS
Qty: 20 TABLET | Refills: 0 | Status: SHIPPED | OUTPATIENT
Start: 2024-12-28 | End: 2024-12-31 | Stop reason: ALTCHOICE

## 2024-12-28 NOTE — PROGRESS NOTES
"Subjective:      Patient ID: Estrella De Oliveira is a 64 y.o. female.    Vitals:  height is 5' 5" (1.651 m) and weight is 108 kg (238 lb). Her tympanic temperature is 98.1 °F (36.7 °C). Her blood pressure is 134/83 and her pulse is 68. Her respiration is 16 and oxygen saturation is 98%.     Chief Complaint: Sore Throat    Patient reports sore throat started this morning. Patient reports gargled with Listerine.  Denies any fever, cough, congestion, GI symptoms, shortness for breath, or chest pain.    Sore Throat   This is a new problem. The current episode started today. The problem has been gradually worsening. The pain is worse on the right side. There has been no fever. The pain is at a severity of 9/10. Pertinent negatives include no abdominal pain, coughing, ear pain, headaches, neck pain or shortness of breath. She has had no exposure to strep or mono. She has tried gargles for the symptoms. The treatment provided no relief.       Constitution: Negative for fever and generalized weakness.   HENT:  Positive for sore throat. Negative for ear pain, postnasal drip, sinus pain and sinus pressure.    Neck: Negative for neck pain.   Cardiovascular:  Negative for chest pain.   Respiratory:  Negative for cough and shortness of breath.    Gastrointestinal:  Negative for abdominal pain.   Neurological:  Negative for headaches.      Objective:     Physical Exam   Constitutional: She is oriented to person, place, and time. She appears well-developed. She is cooperative.  Non-toxic appearance. She does not appear ill. No distress.   HENT:   Head: Normocephalic and atraumatic.   Ears:   Right Ear: Hearing, tympanic membrane, external ear and ear canal normal.   Left Ear: Hearing, tympanic membrane, external ear and ear canal normal.   Nose: Nose normal. No mucosal edema, rhinorrhea or nasal deformity. No epistaxis. Right sinus exhibits no maxillary sinus tenderness and no frontal sinus tenderness. Left sinus exhibits no " maxillary sinus tenderness and no frontal sinus tenderness.   Mouth/Throat: Uvula is midline and mucous membranes are normal. No trismus in the jaw. Normal dentition. No uvula swelling. Posterior oropharyngeal erythema present. No oropharyngeal exudate or posterior oropharyngeal edema. Tonsils are 2+ on the right. Tonsils are 2+ on the left. Tonsillar exudate (Scant scant).   Eyes: Conjunctivae and lids are normal. No scleral icterus.   Neck: Trachea normal and phonation normal. Neck supple. No edema present. No erythema present. No neck rigidity present.   Cardiovascular: Normal rate, regular rhythm, normal heart sounds and normal pulses.   Pulmonary/Chest: Effort normal and breath sounds normal. No respiratory distress. She has no decreased breath sounds. She has no rhonchi.   Abdominal: Normal appearance.   Musculoskeletal: Normal range of motion.         General: No deformity. Normal range of motion.   Neurological: She is alert and oriented to person, place, and time. She exhibits normal muscle tone. Coordination normal.   Skin: Skin is warm, dry, intact, not diaphoretic and not pale.   Psychiatric: Her speech is normal and behavior is normal. Judgment and thought content normal.   Nursing note and vitals reviewed.      Assessment:     1. Strep pharyngitis    2. Sore throat        Plan:   Patient is very well-appearing, alert and active, VSS, and appears well-hydrated.  Differential diagnosis includes, but not limited to, strep pharyngitis, Influenza, Covid, other viral illness, Mononucleosis, or tonsillar abscess.  Rapid strep test positive in clinic today, will treat with Amoxicillin. On physical exam, no s/s tonsillar abscess, meningitis, pneumonia, sepsis, or other serious infectious, respiratory, cardiac, or toxic processes.   Low suspicion of mononucleosis. Thoroughly reviewed strict RTED precautions, supportive care, and home quarantine; patient verbalizes understanding and agrees to follow-up with PCP  in 3 days.    Results for orders placed or performed in visit on 12/28/24   POCT Strep A, Molecular    Collection Time: 12/28/24 12:19 PM   Result Value Ref Range    Molecular Strep A, POC Positive (A) Negative     Acceptable Yes    POCT Influenza A/B MOLECULAR    Collection Time: 12/28/24 12:28 PM   Result Value Ref Range    POC Molecular Influenza A Ag Negative Negative    POC Molecular Influenza B Ag Negative Negative     Acceptable Yes          Strep pharyngitis  -     amoxicillin (AMOXIL) 500 MG Tab; Take 1 tablet (500 mg total) by mouth every 12 (twelve) hours. for 10 days  Dispense: 20 tablet; Refill: 0    Sore throat  -     POCT Influenza A/B MOLECULAR  -     POCT Strep A, Molecular

## 2024-12-31 ENCOUNTER — OFFICE VISIT (OUTPATIENT)
Dept: OTOLARYNGOLOGY | Facility: CLINIC | Age: 64
End: 2024-12-31
Payer: MEDICARE

## 2024-12-31 VITALS
OXYGEN SATURATION: 98 % | SYSTOLIC BLOOD PRESSURE: 164 MMHG | DIASTOLIC BLOOD PRESSURE: 80 MMHG | HEART RATE: 74 BPM | BODY MASS INDEX: 40.47 KG/M2 | WEIGHT: 243.19 LBS

## 2024-12-31 DIAGNOSIS — J34.2 NASAL SEPTAL DEVIATION: ICD-10-CM

## 2024-12-31 DIAGNOSIS — H90.A32 MIXED CONDUCTIVE AND SENSORINEURAL HEARING LOSS OF LEFT EAR WITH RESTRICTED HEARING OF RIGHT EAR: ICD-10-CM

## 2024-12-31 DIAGNOSIS — J30.9 ALLERGIC RHINITIS, UNSPECIFIED SEASONALITY, UNSPECIFIED TRIGGER: Primary | ICD-10-CM

## 2024-12-31 DIAGNOSIS — H92.12 CHRONIC OTORRHEA OF LEFT EAR: ICD-10-CM

## 2024-12-31 DIAGNOSIS — H93.A2 PULSATILE TINNITUS, LEFT EAR: ICD-10-CM

## 2024-12-31 DIAGNOSIS — H90.A12 CONDUCTIVE HEARING LOSS OF LEFT EAR WITH RESTRICTED HEARING OF RIGHT EAR: ICD-10-CM

## 2024-12-31 PROCEDURE — 4010F ACE/ARB THERAPY RXD/TAKEN: CPT | Mod: CPTII,S$GLB,, | Performed by: SPECIALIST

## 2024-12-31 PROCEDURE — 3288F FALL RISK ASSESSMENT DOCD: CPT | Mod: CPTII,S$GLB,, | Performed by: SPECIALIST

## 2024-12-31 PROCEDURE — 99214 OFFICE O/P EST MOD 30 MIN: CPT | Mod: S$GLB,,, | Performed by: SPECIALIST

## 2024-12-31 PROCEDURE — 1126F AMNT PAIN NOTED NONE PRSNT: CPT | Mod: CPTII,S$GLB,, | Performed by: SPECIALIST

## 2024-12-31 PROCEDURE — 1160F RVW MEDS BY RX/DR IN RCRD: CPT | Mod: CPTII,S$GLB,, | Performed by: SPECIALIST

## 2024-12-31 PROCEDURE — 1159F MED LIST DOCD IN RCRD: CPT | Mod: CPTII,S$GLB,, | Performed by: SPECIALIST

## 2024-12-31 PROCEDURE — 3008F BODY MASS INDEX DOCD: CPT | Mod: CPTII,S$GLB,, | Performed by: SPECIALIST

## 2024-12-31 PROCEDURE — 3079F DIAST BP 80-89 MM HG: CPT | Mod: CPTII,S$GLB,, | Performed by: SPECIALIST

## 2024-12-31 PROCEDURE — 3077F SYST BP >= 140 MM HG: CPT | Mod: CPTII,S$GLB,, | Performed by: SPECIALIST

## 2024-12-31 PROCEDURE — 1101F PT FALLS ASSESS-DOCD LE1/YR: CPT | Mod: CPTII,S$GLB,, | Performed by: SPECIALIST

## 2024-12-31 PROCEDURE — 99999 PR PBB SHADOW E&M-EST. PATIENT-LVL V: CPT | Mod: PBBFAC,,, | Performed by: SPECIALIST

## 2024-12-31 NOTE — PROGRESS NOTES
Subjective:       Patient ID: Estrella De Oliveira is a 64 y.o. female.    Chief Complaint: Follow-up      The patient is returning for follow-up visit.  It has been 10 days since I last saw her.  There are multiple issues to discuss:  1. Chronic otorrhea left ear:  The drainage in the left ear has subsided for the last 4 days.  The patient felt like there may be fluid in it yesterday but there was no drainage.  She has finished with the oral Cipro and has Ciprodex drops to use if needed.    2. Allergic rhinitis:  Nasal secretions are clear.  The patient is using mometasone nasal spray.  3. 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  4. Pulsatile tinnitus, left ear:  Pulsatile tinnitus has subsided with cessation of otorrhea.          Review of Systems     Constitutional: Negative for appetite change, chills, fatigue, fever and unexpected weight loss.      HENT: Positive for ear discharge, ear infection, ear pain, hearing loss and postnasal drip.  Negative for facial swelling, mouth sores, nosebleeds, ringing in the ears, runny nose, sinus infection, sinus pressure, sore throat, stuffy nose, tonsil infection, dental problems, trouble swallowing and voice change.      Eyes:  Positive for change in eyesight (blindness left eye). Negative for eye drainage, eye itching and photophobia.     Respiratory:  Negative for cough, shortness of breath, sleep apnea, snoring and wheezing.      Cardiovascular:  Negative for chest pain, foot swelling, irregular heartbeat and swollen veins.     Gastrointestinal:  Positive for acid reflux and heartburn. Negative for abdominal pain, constipation, diarrhea and vomiting.     Genitourinary: Positive for frequent urination. Negative for difficulty urinating and sexual problems.     Musc: Positive for aching joints, aching muscles, back pain and neck pain. Lymphedema  of left lower extremity    Skin: Negative for rash.     Allergy: Negative for food allergies and seasonal allergies.     Endocrine: Negative for cold intolerance and heat intolerance.  Diabetes mellitus, type 2  Morbid obesity    Neurological: Negative for dizziness, headaches, light-headedness, seizures and tremors.  Meningioma of optic nerve sheath    Hematologic: Negative for bruises/bleeds easily and swollen glands.      Psychiatric: Positive for depression, nervous/anxious and sleep disturbance. Negative for decreased concentration.                Objective:      Physical Exam  Vitals and nursing note reviewed.   Constitutional:       General: She is awake.      Appearance: Normal appearance. She is well-developed and well-groomed. She is morbidly obese.   HENT:      Head: Normocephalic.      Jaw: There is normal jaw occlusion.      Salivary Glands: Right salivary gland is not diffusely enlarged or tender. Left salivary gland is not diffusely enlarged or tender.      Right Ear: Hearing, ear canal and external ear normal. Tympanic membrane has decreased mobility.      Left Ear: Hearing, ear canal and external ear normal. Left ear drainage: thin yellow pus in ear canal. A PE tube is present.      Nose: Septal deviation (septum deviated to the right), mucosal edema (cyanotic, boggy inferior turbinates bilaterally) and rhinorrhea (clear mucus bilaterally) present. No nasal deformity. Rhinorrhea is clear.      Right Turbinates: Enlarged and pale.      Left Turbinates: Enlarged and pale.      Mouth/Throat:      Lips: No lesions.      Mouth: Mucous membranes are moist. No oral lesions.      Dentition: Abnormal dentition. No gum lesions.      Tongue: No lesions.      Palate: No mass and lesions.      Pharynx: Oropharynx is clear. Uvula midline.      Tonsils: 2+ on the right. 2+ on the left.   Eyes:      General: Lids are normal.         Right eye: No discharge.         Left eye: No discharge.      Extraocular  Movements: Extraocular movements intact.      Conjunctiva/sclera: Conjunctivae normal.      Pupils: Pupils are equal, round, and reactive to light.      Comments: Left eye-blind   Neck:      Thyroid: No thyroid mass or thyromegaly.      Trachea: Trachea normal. No tracheal deviation.   Cardiovascular:      Rate and Rhythm: Normal rate and regular rhythm.      Pulses: Normal pulses.      Heart sounds: Normal heart sounds.   Pulmonary:      Effort: Pulmonary effort is normal.      Breath sounds: Normal breath sounds. No stridor. No decreased breath sounds, wheezing, rhonchi or rales.   Abdominal:      General: Bowel sounds are normal.      Palpations: Abdomen is soft.      Tenderness: There is no abdominal tenderness.   Musculoskeletal:         General: Normal range of motion.      Cervical back: Normal range of motion. No muscular tenderness.   Lymphadenopathy:      Head:      Right side of head: No submental, submandibular, preauricular, posterior auricular or occipital adenopathy.      Left side of head: No submental, submandibular, preauricular, posterior auricular or occipital adenopathy.      Cervical: No cervical adenopathy.   Skin:     General: Skin is warm and dry.      Findings: No petechiae or rash.      Nails: There is no clubbing.   Neurological:      Mental Status: She is alert and oriented to person, place, and time.      Cranial Nerves: No cranial nerve deficit.      Sensory: No sensory deficit.      Gait: Gait normal.   Psychiatric:         Speech: Speech normal.         Behavior: Behavior normal. Behavior is cooperative.         Thought Content: Thought content normal.         Judgment: Judgment normal.         Review of last audiogram performed 03/25/2024 below  :      Assessment:       1. Allergic rhinitis, unspecified seasonality, unspecified trigger    2. Pulsatile tinnitus, left ear    3. 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 Nasal  septal deviation    5. Chronic otorrhea of left ear    6. Conductive hearing loss of left ear with restricted hearing of right ear          Plan:       I  have ordered the patient to use strict water precautions to the left ear.  I am scheduling him for a CT scan of the temporal bones without IV contrast and a comprehensive auditory evaluation.  If she develops any drainage from the ear or pain in the ear she will restart the Cipro drops.  If she accidentally gets water in the ear she will use the Ciprodex drops for 3 days.  I will telephone reports of the CT scan and audio to the patient.  I will recheck her in 6 weeks.  Based on her most recent audiogram she does meet criteria for bilateral hearing aids/amplification.          DISCLAIMER: This note was prepared with [employer] voice recognition transcription software. Garbled syntax, mangled pronouns, and other bizarre constructions may be attributed to that software system. While efforts were made to correct any mistakes made by this voice recognition program, some errors and/or omissions may remain in the note that were missed when the note was originally created.

## 2025-01-02 ENCOUNTER — CLINICAL SUPPORT (OUTPATIENT)
Dept: OTOLARYNGOLOGY | Facility: CLINIC | Age: 65
End: 2025-01-02
Payer: MEDICARE

## 2025-01-02 DIAGNOSIS — H90.A21 SENSORINEURAL HEARING LOSS (SNHL) OF RIGHT EAR WITH RESTRICTED HEARING OF LEFT EAR: Primary | ICD-10-CM

## 2025-01-02 PROCEDURE — 99999 PR PBB SHADOW E&M-EST. PATIENT-LVL I: CPT | Mod: PBBFAC,,, | Performed by: AUDIOLOGIST

## 2025-01-02 PROCEDURE — 92567 TYMPANOMETRY: CPT | Mod: S$GLB,,, | Performed by: AUDIOLOGIST

## 2025-01-02 PROCEDURE — 92557 COMPREHENSIVE HEARING TEST: CPT | Mod: S$GLB,,, | Performed by: AUDIOLOGIST

## 2025-01-02 NOTE — PROGRESS NOTES
Ms. Estrella De Oliveira was seen in the clinic today for an audiological evaluation.  Ms. De Oliveira reported hearing loss of the left ear and drainage of the left ear.    Audiological testing revealed normal hearing sloping to a mild to moderately-severe sensorineural hearing loss for the right ear and a mild to profound mixed hearing loss for the left ear.  A speech reception threshold was obtained at 20 dBHL for the right ear and at 35 dBHL for the left ear.  Speech discrimination was 100% for the right ear and 88% for the left ear.      Tympanometry testing revealed a Type A tympanogram for the right ear and a Type B (large ear canal volume) tympanogram for the left ear.      Recommendations:  1. Otologic evaluation  2. Annual audiological evaluation  3. Hearing protection when in noise   4. Hearing aid consultation following medical clearance

## 2025-01-02 NOTE — Clinical Note
Happy New Year, Dr. Flood!  Please see audio results.  Patient wants me to let you know that her left ear was draining yesterday.  Thanks! HB

## 2025-01-06 ENCOUNTER — TELEPHONE (OUTPATIENT)
Dept: OTOLARYNGOLOGY | Facility: CLINIC | Age: 65
End: 2025-01-06
Payer: MEDICARE

## 2025-01-06 NOTE — TELEPHONE ENCOUNTER
Pt notified that her hearing hasn't changed very much.  Left ear is a little bit worse. Hearing in both ears is sufficiently bad that she would benefit from wearing hearing aids in both ears.  A copy of the current audiogram will serve as a prescription for hearing aids

## 2025-01-06 NOTE — PROGRESS NOTES
HPI    64 y.o f is here today for a diabetic eye exam. Pt is here for a diabetic   eye exam, pt didn't record her BLS today. PT OS is completely blind.  She   is here for a rx for her OD. Pt sts her OS develops a abnormal amount of   Mucous.   Last edited by Jean Liriano on 12/26/2024 11:32 AM.            Assessment /Plan     For exam results, see Encounter Report.    Type 2 diabetes mellitus without retinopathy    Blindness of left eye with normal vision in contralateral eye    Combined forms of age-related cataract of left eye    Pseudophakia, right eye    Meningioma of optic nerve sheath    Ocular proptosis    Other orders  -     neomycin-polymyxin-dexamethasone (MAXITROL) 3.5mg/mL-10,000 unit/mL-0.1 % DrpS; Place 1 drop into the left eye 4 (four) times daily.  Dispense: 5 mL; Refill: 1      MONITOR. ED PT ON ALL EXAM FINDINGS  RX FINAL SPECS   OCULAR HEALTH STABLE OD, OS; REDUCED VISION OS SECONDARY TO LEFT OPTIC NERVE SHEATH MENINGIOMA AND MATURE CAT OS; CONTINUE W/ OCULOPLASTICS FOR CARE/MGMT  DISCUSSED DRY EYE THERAPIES; RX MAXITROL QID OU X 1 WEEK THEN D/C; CONTINUE W/ AT'S PRN   TYPE 2 DM W/O RETINOPATHY OD; MONITOR. CONTINUE W/ PCP FOR GLYCEMIC CONTROL  RTC 1 YR//PRN FOR REE/DFE

## 2025-01-08 ENCOUNTER — HOSPITAL ENCOUNTER (OUTPATIENT)
Dept: RADIOLOGY | Facility: HOSPITAL | Age: 65
Discharge: HOME | End: 2025-01-08
Attending: SPECIALIST
Payer: MEDICARE

## 2025-01-08 DIAGNOSIS — H90.A12 CONDUCTIVE HEARING LOSS OF LEFT EAR WITH RESTRICTED HEARING OF RIGHT EAR: ICD-10-CM

## 2025-01-08 PROCEDURE — 70480 CT ORBIT/EAR/FOSSA W/O DYE: CPT | Mod: 26,,, | Performed by: RADIOLOGY

## 2025-01-08 PROCEDURE — 70480 CT ORBIT/EAR/FOSSA W/O DYE: CPT | Mod: TC

## 2025-01-14 ENCOUNTER — TELEPHONE (OUTPATIENT)
Dept: OTOLARYNGOLOGY | Facility: CLINIC | Age: 65
End: 2025-01-14
Payer: MEDICARE

## 2025-01-14 ENCOUNTER — TELEPHONE (OUTPATIENT)
Dept: OPHTHALMOLOGY | Facility: CLINIC | Age: 65
End: 2025-01-14
Payer: MEDICARE

## 2025-01-15 ENCOUNTER — OFFICE VISIT (OUTPATIENT)
Dept: OPTOMETRY | Facility: CLINIC | Age: 65
End: 2025-01-15
Payer: MEDICARE

## 2025-01-15 DIAGNOSIS — H53.10 SUBJECTIVE VISUAL DISTURBANCE: Primary | ICD-10-CM

## 2025-01-15 PROCEDURE — 99499 UNLISTED E&M SERVICE: CPT | Mod: S$GLB,,, | Performed by: OPTOMETRIST

## 2025-01-15 PROCEDURE — 99999 PR PBB SHADOW E&M-EST. PATIENT-LVL II: CPT | Mod: PBBFAC,,, | Performed by: OPTOMETRIST

## 2025-01-15 NOTE — PROGRESS NOTES
HPI     Blurred Vision            Comments: NLP OS          Comments    Pt c/o blurred Va w/ floaters OD    ++blurred Va  --diplopia  --eye pain   --flashes/floaters  --headaches  --curtain/shadow/veils    Eye Meds: Lior    POHx:   1. Type II DM w/o Retinopathy          Last edited by Violet Ibarra MA on 1/15/2025 10:14 AM.            Assessment /Plan     For exam results, see Encounter Report.    Subjective visual disturbance    MONITOR. ED PT ON ALL EXAM FINDINGS  UPDATE SPECS; CONSULT W/  REGARDING SEG HEIGHT, ETC  RTC PRN

## 2025-02-12 NOTE — PROGRESS NOTES
Subjective:       Patient ID: Estrella De Oliveira is a 65 y.o. female.    Chief Complaint: No chief complaint on file.      The patient is returning for follow-up visit.  It has been 10 days since I last saw her.  There are multiple issues to discuss:  1. Chronic otorrhea left ear:  The patient had 2 instances where she awakened with some liquid in her ear.  She absorb do with a Q-tip.  Neither episode was associated with strenuous activity or straining.  She did not use her Ciprodex drops.    2. Allergic rhinitis:  Nasal secretions are clear.  The patient is using mometasone nasal spray.  3. 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  She is in the process of discussing hearing aids with the insurance company.  4. Pulsatile tinnitus, left ear:  Pulsatile tinnitus has subsided with cessation of otorrhea.          Review of Systems     Constitutional: Negative for appetite change, chills, fatigue, fever and unexpected weight loss.      HENT: Positive for ear discharge, ear infection, ear pain, hearing loss and postnasal drip.  Negative for facial swelling, mouth sores, nosebleeds, ringing in the ears, runny nose, sinus infection, sinus pressure, sore throat, stuffy nose, tonsil infection, dental problems, trouble swallowing and voice change.      Eyes:  Positive for change in eyesight (blindness left eye). Negative for eye drainage, eye itching and photophobia.     Respiratory:  Negative for cough, shortness of breath, sleep apnea, snoring and wheezing.      Cardiovascular:  Negative for chest pain, foot swelling, irregular heartbeat and swollen veins.     Gastrointestinal:  Positive for acid reflux and heartburn. Negative for abdominal pain, constipation, diarrhea and vomiting.     Genitourinary: Positive for frequent urination. Negative for difficulty urinating and sexual problems.      Musc: Positive for aching joints, aching muscles, back pain and neck pain. Lymphedema of left lower extremity    Skin: Negative for rash.     Allergy: Negative for food allergies and seasonal allergies.     Endocrine: Negative for cold intolerance and heat intolerance.  Diabetes mellitus, type 2  Morbid obesity    Neurological: Negative for dizziness, headaches, light-headedness, seizures and tremors.  Meningioma of optic nerve sheath    Hematologic: Negative for bruises/bleeds easily and swollen glands.      Psychiatric: Positive for depression, nervous/anxious and sleep disturbance. Negative for decreased concentration.                Objective:      Physical Exam  Vitals and nursing note reviewed.   Constitutional:       General: She is awake.      Appearance: Normal appearance. She is well-developed and well-groomed. She is morbidly obese.   HENT:      Head: Normocephalic.      Jaw: There is normal jaw occlusion.      Salivary Glands: Right salivary gland is not diffusely enlarged or tender. Left salivary gland is not diffusely enlarged or tender.      Right Ear: Ear canal and external ear normal. Decreased hearing noted. Tympanic membrane has decreased mobility.      Left Ear: Ear canal and external ear normal. Decreased hearing noted. No drainage (thin yellow pus in ear canal). A PE tube is present.      Ears:        Nose: Septal deviation (septum deviated to the right), mucosal edema (cyanotic, boggy inferior turbinates bilaterally) and rhinorrhea (clear mucus bilaterally) present. No nasal deformity. Rhinorrhea is clear.      Right Turbinates: Enlarged and pale.      Left Turbinates: Enlarged and pale.      Mouth/Throat:      Lips: No lesions.      Mouth: Mucous membranes are moist. No oral lesions.      Dentition: Abnormal dentition. No gum lesions.      Tongue: No lesions.      Palate: No mass and lesions.      Pharynx: Oropharynx is clear. Uvula midline.      Tonsils: 2+ on the right. 2+ on the left.    Eyes:      General: Lids are normal.         Right eye: No discharge.         Left eye: No discharge.      Extraocular Movements: Extraocular movements intact.      Conjunctiva/sclera: Conjunctivae normal.      Pupils: Pupils are equal, round, and reactive to light.      Comments: Left eye-blind   Neck:      Thyroid: No thyroid mass or thyromegaly.      Trachea: Trachea normal. No tracheal deviation.   Cardiovascular:      Rate and Rhythm: Normal rate and regular rhythm.      Pulses: Normal pulses.      Heart sounds: Normal heart sounds.   Pulmonary:      Effort: Pulmonary effort is normal.      Breath sounds: Normal breath sounds. No stridor. No decreased breath sounds, wheezing, rhonchi or rales.   Abdominal:      General: Bowel sounds are normal.      Palpations: Abdomen is soft.      Tenderness: There is no abdominal tenderness.   Musculoskeletal:         General: Normal range of motion.      Cervical back: Normal range of motion. No muscular tenderness.   Lymphadenopathy:      Head:      Right side of head: No submental, submandibular, preauricular, posterior auricular or occipital adenopathy.      Left side of head: No submental, submandibular, preauricular, posterior auricular or occipital adenopathy.      Cervical: No cervical adenopathy.   Skin:     General: Skin is warm and dry.      Findings: No petechiae or rash.      Nails: There is no clubbing.   Neurological:      Mental Status: She is alert and oriented to person, place, and time.      Cranial Nerves: No cranial nerve deficit.      Sensory: No sensory deficit.      Gait: Gait normal.   Psychiatric:         Speech: Speech normal.         Behavior: Behavior normal. Behavior is cooperative.         Thought Content: Thought content normal.         Judgment: Judgment normal.         CT scan of the temporal bones without IV contrast performed 01/08/2025 results below  CT TEMPORAL BONE WITHOUT CONTRAST     CLINICAL HISTORY:  Hearing loss, conductive;chr  otorrhea left ear;Conductive hearing loss, unilateral, left ear with restricted hearing on the contralateral side     TECHNIQUE:  Low dose axial images were acquired through the temporal bones and skull base without contrast administration.  Coronal and sagittal reconstructions were performed.     COMPARISON:  MRI 04/28/2024, 05/10/2016     FINDINGS:  Right temporal bone:     The external auditory canal is patent.  The tympanic membrane is unremarkable.  The middle ear and mastoid air cells are clear.  The ossicles are intact.  No osseous erosion.     The inner ear structures are unremarkable.  The IAC and vestibular aqueduct are not enlarged.  No evidence of otosclerosis or 3rd window.     Left temporal bone:     The external auditory canal and tympanic membrane are unremarkable.  There is scattered opacification of mastoid air cells with small focus of soft tissue within Prussak's space and the sinus tympani (extending to the stapes). The mastoid antrum is predominantly clear.  No evidence of osseous erosion with the ossicles and scutum noted to be intact.     The tegmen tympani is intact however there does appear to be a small defect within the tegmen mastoideum (coronal series 400 image 92 - 103).     The inner ear structures are unremarkable. The IAC and vestibular aqueduct are not enlarged. No evidence of otosclerosis or 3rd window.     The visualized paranasal sinuses are clear.     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     Impression:     On the left there is scattered opacification of mastoid air cells with scattered mucosal thickening within the middle ear including Prussak's space and the sinus tympani.  No osseous erosion.  The ossicles are intact.  There is however suggestion of a potential defect within the tegmen mastoideum and potential CSF leak to at least be considered.        Electronically signed by:Abhijit  Ozuna  Date:                                            01/08/2025           Assessment:       1. Allergic rhinitis, unspecified seasonality, unspecified trigger    2. 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 Pulsatile tinnitus, left ear    4. Nasal septal deviation    5. Otorrhea, left            Plan:       I  will have the patient continue with her nasal steroid and antihistamines.  She has Ciprodex drops to use if she gets water contamination in the ear or has drainage.  The possible dehiscence of the tegmen raises the possibility of CSF otorrhea.  I discuss this issue patient.  Should she develop persistent clear drainage from ear I need to see her when it is occurring  She does have sufficient hearing loss to warrant bilateral amplification/hearing aids.  She is medically cleared for same.  I am giving her a copy of her audiogram with medical clearance written on it.  I will recheck her in 3 months.                  DISCLAIMER: This note was prepared with [employer] voice recognition transcription software. Garbled syntax, mangled pronouns, and other bizarre constructions may be attributed to that software system. While efforts were made to correct any mistakes made by this voice recognition program, some errors and/or omissions may remain in the note that were missed when the note was originally created.

## 2025-02-13 ENCOUNTER — OFFICE VISIT (OUTPATIENT)
Dept: OTOLARYNGOLOGY | Facility: CLINIC | Age: 65
End: 2025-02-13
Payer: MEDICARE

## 2025-02-13 VITALS
WEIGHT: 234.81 LBS | SYSTOLIC BLOOD PRESSURE: 144 MMHG | BODY MASS INDEX: 39.07 KG/M2 | OXYGEN SATURATION: 96 % | HEART RATE: 63 BPM | DIASTOLIC BLOOD PRESSURE: 70 MMHG

## 2025-02-13 DIAGNOSIS — J30.9 ALLERGIC RHINITIS, UNSPECIFIED SEASONALITY, UNSPECIFIED TRIGGER: Primary | ICD-10-CM

## 2025-02-13 DIAGNOSIS — H92.12 OTORRHEA, LEFT: ICD-10-CM

## 2025-02-13 DIAGNOSIS — H93.A2 PULSATILE TINNITUS, LEFT EAR: ICD-10-CM

## 2025-02-13 DIAGNOSIS — H90.A32 MIXED CONDUCTIVE AND SENSORINEURAL HEARING LOSS OF LEFT EAR WITH RESTRICTED HEARING OF RIGHT EAR: ICD-10-CM

## 2025-02-13 DIAGNOSIS — J34.2 NASAL SEPTAL DEVIATION: ICD-10-CM

## 2025-02-13 PROCEDURE — 3288F FALL RISK ASSESSMENT DOCD: CPT | Mod: CPTII,S$GLB,, | Performed by: SPECIALIST

## 2025-02-13 PROCEDURE — 1126F AMNT PAIN NOTED NONE PRSNT: CPT | Mod: CPTII,S$GLB,, | Performed by: SPECIALIST

## 2025-02-13 PROCEDURE — 3078F DIAST BP <80 MM HG: CPT | Mod: CPTII,S$GLB,, | Performed by: SPECIALIST

## 2025-02-13 PROCEDURE — 1159F MED LIST DOCD IN RCRD: CPT | Mod: CPTII,S$GLB,, | Performed by: SPECIALIST

## 2025-02-13 PROCEDURE — 1160F RVW MEDS BY RX/DR IN RCRD: CPT | Mod: CPTII,S$GLB,, | Performed by: SPECIALIST

## 2025-02-13 PROCEDURE — 3077F SYST BP >= 140 MM HG: CPT | Mod: CPTII,S$GLB,, | Performed by: SPECIALIST

## 2025-02-13 PROCEDURE — 99214 OFFICE O/P EST MOD 30 MIN: CPT | Mod: S$GLB,,, | Performed by: SPECIALIST

## 2025-02-13 PROCEDURE — 1101F PT FALLS ASSESS-DOCD LE1/YR: CPT | Mod: CPTII,S$GLB,, | Performed by: SPECIALIST

## 2025-02-13 PROCEDURE — 99999 PR PBB SHADOW E&M-EST. PATIENT-LVL IV: CPT | Mod: PBBFAC,,, | Performed by: SPECIALIST

## 2025-02-13 PROCEDURE — 3008F BODY MASS INDEX DOCD: CPT | Mod: CPTII,S$GLB,, | Performed by: SPECIALIST

## 2025-03-21 ENCOUNTER — TELEPHONE (OUTPATIENT)
Dept: OTOLARYNGOLOGY | Facility: CLINIC | Age: 65
End: 2025-03-21
Payer: MEDICARE

## 2025-03-21 NOTE — TELEPHONE ENCOUNTER
Faxed over audiogram today which will be pt.'s prescription for hearing aids to [facility] per pt.'s request.

## 2025-04-29 ENCOUNTER — HOSPITAL ENCOUNTER (OUTPATIENT)
Dept: RADIOLOGY | Facility: HOSPITAL | Age: 65
Discharge: HOME | End: 2025-04-29
Attending: OPHTHALMOLOGY
Payer: MEDICARE

## 2025-04-29 DIAGNOSIS — H05.112 GRANULOMA OF LEFT ORBIT: ICD-10-CM

## 2025-04-29 DIAGNOSIS — D32.0 MENINGIOMA OF OPTIC NERVE SHEATH: ICD-10-CM

## 2025-04-29 LAB
CREAT SERPL-MCNC: 1 MG/DL (ref 0.5–1.4)
SAMPLE: NORMAL

## 2025-04-29 PROCEDURE — 25500020 PHARM REV CODE 255: Performed by: OPHTHALMOLOGY

## 2025-04-29 PROCEDURE — 70470 CT HEAD/BRAIN W/O & W/DYE: CPT | Mod: 26,,, | Performed by: RADIOLOGY

## 2025-04-29 PROCEDURE — 70470 CT HEAD/BRAIN W/O & W/DYE: CPT | Mod: TC

## 2025-04-29 PROCEDURE — 70553 MRI BRAIN STEM W/O & W/DYE: CPT | Mod: 26,,, | Performed by: RADIOLOGY

## 2025-04-29 PROCEDURE — 70543 MRI ORBT/FAC/NCK W/O &W/DYE: CPT | Mod: TC

## 2025-04-29 PROCEDURE — A9585 GADOBUTROL INJECTION: HCPCS | Performed by: OPHTHALMOLOGY

## 2025-04-29 PROCEDURE — 70543 MRI ORBT/FAC/NCK W/O &W/DYE: CPT | Mod: 26,,, | Performed by: RADIOLOGY

## 2025-04-29 RX ORDER — GADOBUTROL 604.72 MG/ML
10 INJECTION INTRAVENOUS
Status: COMPLETED | OUTPATIENT
Start: 2025-04-29 | End: 2025-04-29

## 2025-04-29 RX ADMIN — GADOBUTROL 10 ML: 604.72 INJECTION INTRAVENOUS at 12:04

## 2025-04-29 RX ADMIN — IOHEXOL 75 ML: 350 INJECTION, SOLUTION INTRAVENOUS at 10:04

## 2025-05-13 ENCOUNTER — OFFICE VISIT (OUTPATIENT)
Dept: OTOLARYNGOLOGY | Facility: CLINIC | Age: 65
End: 2025-05-13
Payer: MEDICARE

## 2025-05-13 VITALS — OXYGEN SATURATION: 98 % | SYSTOLIC BLOOD PRESSURE: 128 MMHG | DIASTOLIC BLOOD PRESSURE: 87 MMHG

## 2025-05-13 DIAGNOSIS — J34.2 NASAL SEPTAL DEVIATION: ICD-10-CM

## 2025-05-13 DIAGNOSIS — H93.A2 PULSATILE TINNITUS, LEFT EAR: ICD-10-CM

## 2025-05-13 DIAGNOSIS — H90.A32 MIXED CONDUCTIVE AND SENSORINEURAL HEARING LOSS OF LEFT EAR WITH RESTRICTED HEARING OF RIGHT EAR: Primary | ICD-10-CM

## 2025-05-13 DIAGNOSIS — H54.40 BLINDNESS OF LEFT EYE WITH NORMAL VISION IN CONTRALATERAL EYE: ICD-10-CM

## 2025-05-13 DIAGNOSIS — H92.12 OTORRHEA, LEFT: ICD-10-CM

## 2025-05-13 DIAGNOSIS — J30.9 ALLERGIC RHINITIS, UNSPECIFIED SEASONALITY, UNSPECIFIED TRIGGER: ICD-10-CM

## 2025-05-13 DIAGNOSIS — R13.10 DYSPHAGIA, UNSPECIFIED TYPE: ICD-10-CM

## 2025-05-13 PROCEDURE — 3074F SYST BP LT 130 MM HG: CPT | Mod: CPTII,S$GLB,, | Performed by: SPECIALIST

## 2025-05-13 PROCEDURE — 1101F PT FALLS ASSESS-DOCD LE1/YR: CPT | Mod: CPTII,S$GLB,, | Performed by: SPECIALIST

## 2025-05-13 PROCEDURE — 3288F FALL RISK ASSESSMENT DOCD: CPT | Mod: CPTII,S$GLB,, | Performed by: SPECIALIST

## 2025-05-13 PROCEDURE — 1159F MED LIST DOCD IN RCRD: CPT | Mod: CPTII,S$GLB,, | Performed by: SPECIALIST

## 2025-05-13 PROCEDURE — 99214 OFFICE O/P EST MOD 30 MIN: CPT | Mod: S$GLB,,, | Performed by: SPECIALIST

## 2025-05-13 PROCEDURE — 3079F DIAST BP 80-89 MM HG: CPT | Mod: CPTII,S$GLB,, | Performed by: SPECIALIST

## 2025-05-13 PROCEDURE — 1160F RVW MEDS BY RX/DR IN RCRD: CPT | Mod: CPTII,S$GLB,, | Performed by: SPECIALIST

## 2025-05-13 PROCEDURE — 99999 PR PBB SHADOW E&M-EST. PATIENT-LVL IV: CPT | Mod: PBBFAC,,, | Performed by: SPECIALIST

## 2025-05-13 PROCEDURE — 4010F ACE/ARB THERAPY RXD/TAKEN: CPT | Mod: CPTII,S$GLB,, | Performed by: SPECIALIST

## 2025-05-13 RX ORDER — CIPROFLOXACIN AND DEXAMETHASONE 3; 1 MG/ML; MG/ML
SUSPENSION/ DROPS AURICULAR (OTIC)
Qty: 7.5 ML | Refills: 3 | Status: SHIPPED | OUTPATIENT
Start: 2025-05-13

## 2025-05-13 RX ORDER — CETIRIZINE HYDROCHLORIDE 10 MG/1
10 TABLET ORAL DAILY
Qty: 9 TABLET | Refills: 3
Start: 2025-05-13 | End: 2026-05-13

## 2025-05-13 RX ORDER — MONTELUKAST SODIUM 10 MG/1
TABLET ORAL
Qty: 9 TABLET | Refills: 3 | Status: SHIPPED | OUTPATIENT
Start: 2025-05-13

## 2025-05-13 NOTE — PROGRESS NOTES
Subjective:       Patient ID: Estrella De Oliveira is a 65 y.o. female.    Chief Complaint: Ear Drainage and Follow-up      The patient is returning for follow-up visit.  It has been 3 months since I last saw her.  There are multiple issues to discuss:  1. Chronic otorrhea left ear:  The patient had clear otorrhea when she awakened on May 11th.  She had not had drainage the night before she went to bed and did not get water in her ear.  She did not use her Ciprodex drops in the otorrhea subsided.    2. Allergic rhinitis:  Nasal secretions are clear.  She is having significant postnasal drip on an almost everyday basis.  The patient is using mometasone nasal spray which helps but does not cure the otorrhea.  3. 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  She has purchased hearing aids which are helpful.  She does not wear them all day long everyday.    4. Pulsatile tinnitus, left ear:  Pulsatile tinnitus has subsided with cessation of otorrhea.  5. History of esophageal stricture: The patient reports that she has had an esophageal stricture dilated twice in the past.  She is not having issues with swallowing her food.  When her postnasal drip is heavy she will choke up thick mucus.            Review of Systems     Constitutional: Negative for appetite change, chills, fatigue, fever and unexpected weight loss.      HENT: Positive for ear discharge, ear infection, ear pain, hearing loss and postnasal drip.  Negative for facial swelling, mouth sores, nosebleeds, ringing in the ears, runny nose, sinus infection, sinus pressure, sore throat, stuffy nose, tonsil infection, dental problems, trouble swallowing and voice change.      Eyes:  Positive for change in eyesight (blindness left eye). Negative for eye drainage, eye itching and photophobia.     Respiratory:  Negative for cough, shortness of  breath, sleep apnea, snoring and wheezing.      Cardiovascular:  Negative for chest pain, foot swelling, irregular heartbeat and swollen veins.     Gastrointestinal:  Positive for acid reflux and heartburn. Negative for abdominal pain, constipation, diarrhea and vomiting.     Genitourinary: Positive for frequent urination. Negative for difficulty urinating and sexual problems.     Musc: Positive for aching joints, aching muscles, back pain and neck pain. Lymphedema of left lower extremity    Skin: Negative for rash.     Allergy: Negative for food allergies and seasonal allergies.     Endocrine: Negative for cold intolerance and heat intolerance.  Diabetes mellitus, type 2  Morbid obesity    Neurological: Negative for dizziness, headaches, light-headedness, seizures and tremors.  Meningioma of optic nerve sheath    Hematologic: Negative for bruises/bleeds easily and swollen glands.      Psychiatric: Positive for depression, nervous/anxious and sleep disturbance. Negative for decreased concentration.                Objective:      Physical Exam  Vitals and nursing note reviewed.   Constitutional:       General: She is awake.      Appearance: Normal appearance. She is well-developed and well-groomed. She is morbidly obese.   HENT:      Head: Normocephalic.      Jaw: There is normal jaw occlusion.      Salivary Glands: Right salivary gland is not diffusely enlarged or tender. Left salivary gland is not diffusely enlarged or tender.      Right Ear: Ear canal and external ear normal. Decreased hearing noted. Tympanic membrane has decreased mobility.      Left Ear: Ear canal and external ear normal. Decreased hearing noted. No drainage (thin yellow pus in ear canal). A PE tube is present.      Ears:        Nose: Septal deviation (septum deviated to the right), mucosal edema (cyanotic, boggy inferior turbinates bilaterally) and rhinorrhea (clear mucus bilaterally) present. No nasal deformity. Rhinorrhea is clear.      Right  Turbinates: Enlarged and pale.      Left Turbinates: Enlarged and pale.      Mouth/Throat:      Lips: No lesions.      Mouth: Mucous membranes are moist. No oral lesions.      Dentition: Abnormal dentition. No gum lesions.      Tongue: No lesions.      Palate: No mass and lesions.      Pharynx: Oropharynx is clear. Uvula midline.      Tonsils: 2+ on the right. 2+ on the left.   Eyes:      General: Lids are normal.         Right eye: No discharge.         Left eye: No discharge.      Extraocular Movements: Extraocular movements intact.      Conjunctiva/sclera: Conjunctivae normal.      Pupils: Pupils are equal, round, and reactive to light.      Comments: Left eye-blind   Neck:      Thyroid: No thyroid mass or thyromegaly.      Trachea: Trachea normal. No tracheal deviation.   Cardiovascular:      Rate and Rhythm: Normal rate and regular rhythm.      Pulses: Normal pulses.      Heart sounds: Normal heart sounds.   Pulmonary:      Effort: Pulmonary effort is normal.      Breath sounds: Normal breath sounds. No stridor. No decreased breath sounds, wheezing, rhonchi or rales.   Abdominal:      General: Bowel sounds are normal.      Palpations: Abdomen is soft.      Tenderness: There is no abdominal tenderness.   Musculoskeletal:         General: Normal range of motion.      Cervical back: Normal range of motion. No muscular tenderness.   Lymphadenopathy:      Head:      Right side of head: No submental, submandibular, preauricular, posterior auricular or occipital adenopathy.      Left side of head: No submental, submandibular, preauricular, posterior auricular or occipital adenopathy.      Cervical: No cervical adenopathy.   Skin:     General: Skin is warm and dry.      Findings: No petechiae or rash.      Nails: There is no clubbing.   Neurological:      Mental Status: She is alert and oriented to person, place, and time.      Cranial Nerves: No cranial nerve deficit.      Sensory: No sensory deficit.      Gait: Gait  normal.   Psychiatric:         Speech: Speech normal.         Behavior: Behavior normal. Behavior is cooperative.         Thought Content: Thought content normal.         Judgment: Judgment normal.                   Assessment:       1. Mixed conductive and sensorineural hearing loss of left ear with restricted hearing of right ear    2. Pulsatile tinnitus, left ear    3. Otorrhea, left    4. Nasal septal deviation    5. Allergic rhinitis, unspecified seasonality, unspecified trigger    6. Dysphagia, unspecified type    7. 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  She has Ciprodex drops to use if she gets water contamination in the ear or has drainage.  I am encouraging her to wear her hearing aids most of the time.  I will recheck her in 6 weeks.                  DISCLAIMER: This note was prepared with [employer] voice recognition transcription software. Garbled syntax, mangled pronouns, and other bizarre constructions may be attributed to that software system. While efforts were made to correct any mistakes made by this voice recognition program, some errors and/or omissions may remain in the note that were missed when the note was originally created.

## 2025-05-19 ENCOUNTER — HOSPITAL ENCOUNTER (OUTPATIENT)
Dept: RADIOLOGY | Facility: HOSPITAL | Age: 65
Discharge: HOME | End: 2025-05-19
Attending: OPHTHALMOLOGY
Payer: MEDICARE

## 2025-05-19 DIAGNOSIS — H05.119: ICD-10-CM

## 2025-05-19 PROCEDURE — 70481 CT ORBIT/EAR/FOSSA W/DYE: CPT | Mod: 26,,, | Performed by: RADIOLOGY

## 2025-05-19 PROCEDURE — 25500020 PHARM REV CODE 255: Performed by: OPHTHALMOLOGY

## 2025-05-19 PROCEDURE — 70481 CT ORBIT/EAR/FOSSA W/DYE: CPT | Mod: TC

## 2025-05-19 RX ADMIN — IOHEXOL 100 ML: 350 INJECTION, SOLUTION INTRAVENOUS at 01:05

## 2025-05-29 RX ORDER — MONTELUKAST SODIUM 10 MG/1
10 TABLET ORAL NIGHTLY
Qty: 9 TABLET | Refills: 3 | Status: SHIPPED | OUTPATIENT
Start: 2025-05-29

## 2025-06-23 ENCOUNTER — HOSPITAL ENCOUNTER (EMERGENCY)
Facility: OTHER | Age: 65
Discharge: HOME | End: 2025-06-24
Attending: EMERGENCY MEDICINE
Payer: MEDICARE

## 2025-06-23 VITALS
OXYGEN SATURATION: 100 % | WEIGHT: 225 LBS | SYSTOLIC BLOOD PRESSURE: 128 MMHG | RESPIRATION RATE: 14 BRPM | DIASTOLIC BLOOD PRESSURE: 75 MMHG | HEIGHT: 65 IN | TEMPERATURE: 98 F | HEART RATE: 61 BPM | BODY MASS INDEX: 37.49 KG/M2

## 2025-06-23 DIAGNOSIS — N39.0 URINARY TRACT INFECTION WITHOUT HEMATURIA, SITE UNSPECIFIED: Primary | ICD-10-CM

## 2025-06-23 DIAGNOSIS — N20.0 KIDNEY STONE: ICD-10-CM

## 2025-06-23 DIAGNOSIS — M54.9 BACK PAIN, UNSPECIFIED BACK LOCATION, UNSPECIFIED BACK PAIN LATERALITY, UNSPECIFIED CHRONICITY: ICD-10-CM

## 2025-06-23 LAB
AMORPH CRY UR QL COMP ASSIST: ABNORMAL
BACTERIA #/AREA URNS AUTO: ABNORMAL /HPF
BILIRUB UR QL STRIP.AUTO: ABNORMAL
CLARITY UR: ABNORMAL
COLOR UR AUTO: YELLOW
GLUCOSE UR QL STRIP: NEGATIVE
HCV AB SERPL QL IA: NEGATIVE
HGB UR QL STRIP: NEGATIVE
HIV 1+2 AB+HIV1 P24 AG SERPL QL IA: NEGATIVE
KETONES UR QL STRIP: ABNORMAL
LEUKOCYTE ESTERASE UR QL STRIP: ABNORMAL
MICROSCOPIC COMMENT: ABNORMAL
NITRITE UR QL STRIP: NEGATIVE
PH UR STRIP: 6 [PH]
PROT UR QL STRIP: NEGATIVE
RBC #/AREA URNS AUTO: 12 /HPF (ref 0–4)
SP GR UR STRIP: >=1.03
SQUAMOUS #/AREA URNS AUTO: 35 /HPF
UROBILINOGEN UR STRIP-ACNC: 1 EU/DL
WBC #/AREA URNS AUTO: 85 /HPF (ref 0–5)

## 2025-06-23 PROCEDURE — 99284 EMERGENCY DEPT VISIT MOD MDM: CPT | Mod: 25

## 2025-06-23 PROCEDURE — 86803 HEPATITIS C AB TEST: CPT | Performed by: EMERGENCY MEDICINE

## 2025-06-23 PROCEDURE — 87389 HIV-1 AG W/HIV-1&-2 AB AG IA: CPT | Performed by: EMERGENCY MEDICINE

## 2025-06-23 PROCEDURE — 87086 URINE CULTURE/COLONY COUNT: CPT | Performed by: EMERGENCY MEDICINE

## 2025-06-23 PROCEDURE — 81003 URINALYSIS AUTO W/O SCOPE: CPT | Performed by: EMERGENCY MEDICINE

## 2025-06-23 RX ORDER — IBUPROFEN 600 MG/1
600 TABLET, FILM COATED ORAL
Status: COMPLETED | OUTPATIENT
Start: 2025-06-24 | End: 2025-06-23

## 2025-06-23 RX ORDER — CEPHALEXIN 125 MG/5ML
500 POWDER, FOR SUSPENSION ORAL
Status: DISCONTINUED | OUTPATIENT
Start: 2025-06-24 | End: 2025-06-23

## 2025-06-23 RX ORDER — CEPHALEXIN 250 MG/5ML
500 POWDER, FOR SUSPENSION ORAL EVERY 8 HOURS
Qty: 210 ML | Refills: 0 | Status: SHIPPED | OUTPATIENT
Start: 2025-06-23 | End: 2025-06-30

## 2025-06-23 RX ORDER — CEPHALEXIN 125 MG/5ML
500 POWDER, FOR SUSPENSION ORAL ONCE
Status: DISCONTINUED | OUTPATIENT
Start: 2025-06-24 | End: 2025-06-24 | Stop reason: HOSPADM

## 2025-06-23 RX ADMIN — IBUPROFEN 600 MG: 600 TABLET ORAL at 11:06

## 2025-06-24 ENCOUNTER — OFFICE VISIT (OUTPATIENT)
Dept: OTOLARYNGOLOGY | Facility: CLINIC | Age: 65
End: 2025-06-24
Payer: MEDICARE

## 2025-06-24 ENCOUNTER — CLINICAL SUPPORT (OUTPATIENT)
Dept: OTOLARYNGOLOGY | Facility: CLINIC | Age: 65
End: 2025-06-24
Payer: MEDICARE

## 2025-06-24 VITALS
OXYGEN SATURATION: 99 % | WEIGHT: 233.56 LBS | HEART RATE: 67 BPM | DIASTOLIC BLOOD PRESSURE: 80 MMHG | BODY MASS INDEX: 38.87 KG/M2 | SYSTOLIC BLOOD PRESSURE: 157 MMHG

## 2025-06-24 DIAGNOSIS — J30.9 ALLERGIC RHINITIS, UNSPECIFIED SEASONALITY, UNSPECIFIED TRIGGER: ICD-10-CM

## 2025-06-24 DIAGNOSIS — H92.12 OTORRHEA, LEFT: ICD-10-CM

## 2025-06-24 DIAGNOSIS — H90.A32 MIXED CONDUCTIVE AND SENSORINEURAL HEARING LOSS OF LEFT EAR WITH RESTRICTED HEARING OF RIGHT EAR: ICD-10-CM

## 2025-06-24 DIAGNOSIS — H92.12 OTORRHEA, LEFT: Primary | ICD-10-CM

## 2025-06-24 DIAGNOSIS — H91.22 SUDDEN LEFT HEARING LOSS: Primary | ICD-10-CM

## 2025-06-24 DIAGNOSIS — J34.2 NASAL SEPTAL DEVIATION: ICD-10-CM

## 2025-06-24 DIAGNOSIS — H54.40 BLINDNESS OF LEFT EYE WITH NORMAL VISION IN CONTRALATERAL EYE: ICD-10-CM

## 2025-06-24 LAB — HOLD SPECIMEN: NORMAL

## 2025-06-24 PROCEDURE — 4010F ACE/ARB THERAPY RXD/TAKEN: CPT | Mod: CPTII,S$GLB,, | Performed by: SPECIALIST

## 2025-06-24 PROCEDURE — 1159F MED LIST DOCD IN RCRD: CPT | Mod: CPTII,S$GLB,, | Performed by: SPECIALIST

## 2025-06-24 PROCEDURE — 25000003 PHARM REV CODE 250: Performed by: EMERGENCY MEDICINE

## 2025-06-24 PROCEDURE — 1160F RVW MEDS BY RX/DR IN RCRD: CPT | Mod: CPTII,S$GLB,, | Performed by: SPECIALIST

## 2025-06-24 PROCEDURE — 3288F FALL RISK ASSESSMENT DOCD: CPT | Mod: CPTII,S$GLB,, | Performed by: SPECIALIST

## 2025-06-24 PROCEDURE — 92557 COMPREHENSIVE HEARING TEST: CPT | Mod: S$GLB,,, | Performed by: AUDIOLOGIST

## 2025-06-24 PROCEDURE — 99214 OFFICE O/P EST MOD 30 MIN: CPT | Mod: S$GLB,,, | Performed by: SPECIALIST

## 2025-06-24 PROCEDURE — 3077F SYST BP >= 140 MM HG: CPT | Mod: CPTII,S$GLB,, | Performed by: SPECIALIST

## 2025-06-24 PROCEDURE — 3079F DIAST BP 80-89 MM HG: CPT | Mod: CPTII,S$GLB,, | Performed by: SPECIALIST

## 2025-06-24 PROCEDURE — 1101F PT FALLS ASSESS-DOCD LE1/YR: CPT | Mod: CPTII,S$GLB,, | Performed by: SPECIALIST

## 2025-06-24 PROCEDURE — 1126F AMNT PAIN NOTED NONE PRSNT: CPT | Mod: CPTII,S$GLB,, | Performed by: SPECIALIST

## 2025-06-24 PROCEDURE — 99999 PR PBB SHADOW E&M-EST. PATIENT-LVL I: CPT | Mod: PBBFAC,,, | Performed by: AUDIOLOGIST

## 2025-06-24 PROCEDURE — 3008F BODY MASS INDEX DOCD: CPT | Mod: CPTII,S$GLB,, | Performed by: SPECIALIST

## 2025-06-24 PROCEDURE — 92567 TYMPANOMETRY: CPT | Mod: S$GLB,,, | Performed by: AUDIOLOGIST

## 2025-06-24 PROCEDURE — 99999 PR PBB SHADOW E&M-EST. PATIENT-LVL III: CPT | Mod: PBBFAC,,, | Performed by: SPECIALIST

## 2025-06-24 RX ORDER — CIPROFLOXACIN AND DEXAMETHASONE 3; 1 MG/ML; MG/ML
SUSPENSION/ DROPS AURICULAR (OTIC)
Qty: 7.5 ML | Refills: 3 | Status: SHIPPED | OUTPATIENT
Start: 2025-06-24

## 2025-06-24 NOTE — PROGRESS NOTES
Ms. Estrella De Oliveira was seen in the clinic today for an audiological evaluation.  She reported a  sudden change in hearing sensitivity of her left ear since her previous audiogram ().  She currently uses binaural amplification purchased at an outside facility.  She reportedly has misplaced her  for her hearing aids.      Audiological testing revealed hearing in the normal sloping to mild to moderately-severe sensorineural range for the right ear and revealed hearing in the moderate to profound mixed range for the left ear.  A speech reception threshold was obtained at 25 dBHL for the right ear and at 45 dBHL for the left ear.  Speech discrimination scores were 100%% for the right ear and 80%% for the left ear.      Tympanometry testing revealed a Type A tympanogram for the right ear and revealed a Type B normal ear canal volume tympanogram for the left ear.      Recommendations:  1. Otologic evaluation  2. Annual audiological evaluation  3. Hearing protection when in noise   4. Continue daily use of binaural amplification  5. Follow-up PRN with dispensing audiologist

## 2025-06-24 NOTE — ED NOTES
Estrella De Oliveira, a 65 y.o. female presents to the ED with R side flank pain x3 days. Pt denies recent abx use, denies UTI sx. No fevers, chills, n/v/d. Pt took muscle relaxer pta. Pain currently 7/10. Visitor at bedside. Pt ambulatory to restroom independently.     Pt resting comfortably. ED workup in progress. Call light within reach. Safety measures in place. Denies further needs. Plan of care ongoing.      Chief Complaint   Patient presents with    Flank Pain     R flank pain radiating to R side x 3 weeks. Reports hx kidney stone that passed on its own. Denies fever/chills, hematuria, dysuria.      Review of patient's allergies indicates:   Allergen Reactions    Adhesive      Other reaction(s): BLISTERS    Latex      Added based on information entered during log entry, please review and add reactions, type, and severity as needed    Pramipexole Itching     Patient states it caused her to have severe itching    Silicone Itching    Silicones      Other reaction(s): BLISTERS    Adhesive tape-silicones Rash     Past Medical History:   Diagnosis Date    Essential (primary) hypertension     Legally blind in left eye, as defined in [place]     Type 2 diabetes mellitus with unspecified diabetic retinopathy without macular edema      Past Surgical History:   Procedure Laterality Date    GALLBLADDER SURGERY      VT ANTOLIN,SWALLOW FUNCTION,CINE/VIDEO RECORD  8/26/2021         REVISION OF TOTAL KNEE REPLACEMENT

## 2025-06-24 NOTE — ED PROVIDER NOTES
Encounter Date: 6/23/2025       History     Chief Complaint   Patient presents with    Flank Pain     R flank pain radiating to R side x 3 weeks. Reports hx kidney stone that passed on its own. Denies fever/chills, hematuria, dysuria.      This is a pleasant 64 yo woman presenting for evaluation of pain along the right flank over several days. She has a history of kidney stones and this feels similar but not quite as intense. She denies any fevers, chills, trauma or overt inciting event. She denies hematuria. She denies vomiting or nausea.     The history is provided by the patient, medical records and the spouse.     Review of patient's allergies indicates:   Allergen Reactions    Adhesive      Other reaction(s): BLISTERS    Latex      Added based on information entered during log entry, please review and add reactions, type, and severity as needed    Pramipexole Itching     Patient states it caused her to have severe itching    Silicone Itching    Silicones      Other reaction(s): BLISTERS    Adhesive tape-silicones Rash     Past Medical History:   Diagnosis Date    Essential (primary) hypertension     Legally blind in left eye, as defined in [place]     Type 2 diabetes mellitus with unspecified diabetic retinopathy without macular edema      Past Surgical History:   Procedure Laterality Date    GALLBLADDER SURGERY      MI EVAL,SWALLOW FUNCTION,CINE/VIDEO RECORD  8/26/2021         REVISION OF TOTAL KNEE REPLACEMENT        Family History   Problem Relation Name Age of Onset    Colon cancer Brother      Breast cancer Maternal Aunt      Ovarian cancer Neg Hx       Social History[1]  Review of Systems  Constitutional-no fever  HEENT-no congestion  Eyes-no redness  Respiratory-no shortness of breath  Cardio-no chest pain  GI-no abdominal pain  Endocrine-no cold intolerance  -+ flank pain   MSK-no myalgias  Skin-no rashes  Allergy-no environmental allergy  Neurologic-, no headache  Hematology-no swollen  nodes  Behavioral-no confusion   Physical Exam     Initial Vitals [06/23/25 2029]   BP Pulse Resp Temp SpO2   (!) 149/95 72 20 98 °F (36.7 °C) 97 %      MAP       --         Physical Exam  Constitutional: Well appearing, no distress.  Eyes: Conjunctivae normal.  ENT       Head: Normocephalic, atraumatic.       Nose: Normal external appearance        Mouth/Throat: no strigulous respirations   Hematological/Lymphatic/Immunilogical: no visible lymphadenopathy   Cardiovascular: Normal rate,   Respiratory: Normal respiratory effort.   Gastrointestinal: non distended   Musculoskeletal: Normal range of motion in all extremities. No obvious deformities or swelling.  Neurologic: Alert, oriented. Normal speech and language. No gross focal neurologic deficits are appreciated.  Skin: Skin is warm, dry. No rash noted.  Psychiatric: Mood and affect are normal.    ED Course   Procedures  Labs Reviewed   URINALYSIS, REFLEX TO URINE CULTURE - Abnormal       Result Value    Color, UA Yellow      Appearance, UA Hazy (*)     pH, UA 6.0      Spec Grav UA >=1.030 (*)     Protein, UA Negative      Glucose, UA Negative      Ketones, UA Trace (*)     Bilirubin, UA 1+ (*)     Blood, UA Negative      Nitrites, UA Negative      Urobilinogen, UA 1.0      Leukocyte Esterase, UA 2+ (*)    URINALYSIS MICROSCOPIC - Abnormal    RBC, UA 12 (*)     WBC, UA 85 (*)     Bacteria, UA Few (*)     Squamous Epithelial Cells, UA 35      Amorphous, UA Few      Microscopic Comment       HEPATITIS C ANTIBODY - Normal    Hep C Ab Interp Negative     HIV 1 / 2 ANTIBODY - Normal    HIV 1/2 Ag/Ab Negative     CULTURE, URINE    Urine Culture        Value: Multiple organisms isolated. None in predominance. Repeat if clinically necessary.   GREY TOP URINE HOLD    Extra Tube Hold for add-ons.            Imaging Results              CT Renal Stone Study ABD Pelvis WO (Final result)  Result time 06/23/25 22:48:11      Final result by Jose Knox MD (06/23/25  22:48:11)                   Impression:    IMPRESSION:    1.  No acute abnormality on noncontrast abdomen and pelvis CT. The appendix is normal.    2.  Nonobstructing left lower pole intrarenal stone measuring 10 mm. No ureteral stones or hydronephrosis.    3.  Prior cholecystectomy. Moderate aortic atherosclerotic calcification.    -Electronically Signed By: Jose Knox MD   -Electronically Signed On:  6/23/2025 10:48 PM      Report Ends               Narrative:    EXAM: Abdomen and Pelvis CT without contrast    HISTORY: Acute flank pain    TECHNIQUE: Multiple axial images are obtained through the abdomen and pelvis without intravenous or oral contrast.  Coronal and sagittal reformatted images are obtained.    All CT scans are performed using dose optimization technique as appropriate to a performed exam including automated exposure control and/or standardized protocols for targeted exams where dose is matched to indication/reason for exam/patient size.    COMPARISON: None.    FINDINGS:    ABDOMEN CT:    Limited assessment of the lung bases is unremarkable.    Noncontrast evaluation of the liver, spleen, adrenal glands, and pancreas are unremarkable. There has been a cholecystectomy. There is moderate aortic atherosclerotic calcification. The stomach and visualized bowel in the upper abdomen are unremarkable. There is no fluid collection or free air. The appendix is normal.    There is a nonobstructing left lower pole intrarenal stone measuring 10 mm. Both kidneys are otherwise unremarkable without ureteral stone or hydronephrosis.    PELVIS CT:    The visualized bowel the pelvis is unremarkable.  The distal ureters and bladder are unremarkable. There is no pelvic fluid collection.    The bones of the abdomen and pelvis show no acute abnormality. There is moderate multilevel lower lumbar facet DJD.                                       Medications   ibuprofen tablet 600 mg (600 mg Oral Given 6/23/25 7611)      Medical Decision Making  Abdominal pain represents a profoundly broad differential, this patient's symptoms are nondescript in nature and while unlikely could represent-  Pancreatitis, cholecystitis, appendicitis, gastritis, cystitis, pyleonephritis, PUD, obstructions constipation neoplasm among a myriad of other diagnoses.     A thorough examination and history was undertaken and appropriate diagnostics ordered with a diagnosis identified as below based on summative findings.   Because the broad differential in potential for changes in symptoms and discussion was also undertaken related to the importance of follow-up return or acknowledgement of new or changes in symptoms.       Problems Addressed:  Back pain, unspecified back location, unspecified back pain laterality, unspecified chronicity: acute illness or injury  Kidney stone: chronic illness or injury  Urinary tract infection without hematuria, site unspecified: acute illness or injury    Amount and/or Complexity of Data Reviewed  External Data Reviewed: labs and notes.     Details: Hx of otitis externa   Labs: ordered. Decision-making details documented in ED Course.  Radiology: ordered and independent interpretation performed. Decision-making details documented in ED Course.    Risk  OTC drugs.  Prescription drug management.                                      Clinical Impression:  Final diagnoses:  [N39.0] Urinary tract infection without hematuria, site unspecified (Primary)  [M54.9] Back pain, unspecified back location, unspecified back pain laterality, unspecified chronicity  [N20.0] Kidney stone          ED Disposition Condition    Discharge Stable          ED Prescriptions       Medication Sig Dispense Start Date End Date Auth. Provider    cephALEXin (KEFLEX) 250 mg/5 mL suspension (Expires today) Take 10 mLs (500 mg total) by mouth every 8 (eight) hours. for 7 days 210 mL 6/23/2025 6/30/2025 Silviano Giles MD          Follow-up Information        Follow up With Specialties Details Why Contact Info    [facility] Emergency Medicine Go to  As needed, For a follow up visit about today [address]  [facility] [id number] [phone]          [facility] MDCalc MDM  MDCalc MDM Module  Jun 30 2025 1:44 PM [Silviano Giles]  Data:  - Independent interpretation: I independently reviewed the CT Abd+Pelvis WO contr. See MDM section and/or ED Course for my interpretation. stone in the left kidney, no hydronephrosis  [Silviano Giles]  - Test/documents/historian: 3 tests ordered  Problems: concern for pyelonephritis or renal abscess (high)  Additional encounter diagnoses: Urinary tract infection without hematuria, site unspecified, Back pain, unspecified back location, unspecified back pain laterality, unspecified chronicity, Kidney stone  Risk: RX: cephALEXin suspension + 1 more (Rx drug management), CT Abd+Pelvis WO contr (CT w/o IV contrast)             [1]   Social History  Tobacco Use    Smoking status: Never    Smokeless tobacco: Never   Substance Use Topics    Alcohol use: Never    Drug use: Never        Silviano Giles MD  06/30/25 1892

## 2025-06-24 NOTE — PROGRESS NOTES
Subjective:       Patient ID: Estrella De Oliveira is a 65 y.o. female.    Chief Complaint: Ear Drainage, Ear Fullness, and Follow-up      The patient is returning for follow-up visit.  It has been 3 months since I last saw her.  There are multiple issues to discuss:  1. Chronic otorrhea left ear:  After the patient's otorrhea subsided.  Otorrhea of clear fluid only resume 4 days ago.  It became cloudy in nature and then subsided.  She was using her Ciprodex drops.  The hearing in her left ear has dropped significantly with the cessation of the otorrhea.    2. Allergic rhinitis:  Nasal secretions are clear.  She is having significant postnasal drip on an almost everyday basis.  The patient is using mometasone nasal spray which helps but does not cure the otorrhea.  3. 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  She has purchased hearing aids which are helpful.  She does not wear them all day long everyday.    4. Pulsatile tinnitus, left ear:  Pulsatile tinnitus has not resumed.    5. History of esophageal stricture: The patient reports that she has had an esophageal stricture dilated twice in the past.  She is not having issues with swallowing her food.  When her postnasal drip is heavy she will choke up thick mucus.            Review of Systems     Constitutional: Negative for appetite change, chills, fatigue, fever and unexpected weight loss.      HENT: Positive for ear discharge, ear infection, ear pain, hearing loss and postnasal drip.  Negative for facial swelling, mouth sores, nosebleeds, ringing in the ears, runny nose, sinus infection, sinus pressure, sore throat, stuffy nose, tonsil infection, dental problems, trouble swallowing and voice change.      Eyes:  Positive for change in eyesight (blindness left eye). Negative for eye drainage, eye itching and photophobia.     Respiratory:   Negative for cough, shortness of breath, sleep apnea, snoring and wheezing.      Cardiovascular:  Negative for chest pain, foot swelling, irregular heartbeat and swollen veins.     Gastrointestinal:  Positive for acid reflux and heartburn. Negative for abdominal pain, constipation, diarrhea and vomiting.     Genitourinary: Positive for frequent urination. Negative for difficulty urinating and sexual problems.     Musc: Positive for aching joints, aching muscles, back pain and neck pain. Lymphedema of left lower extremity    Skin: Negative for rash.     Allergy: Negative for food allergies and seasonal allergies.     Endocrine: Negative for cold intolerance and heat intolerance.  Diabetes mellitus, type 2  Morbid obesity    Neurological: Negative for dizziness, headaches, light-headedness, seizures and tremors.  Meningioma of optic nerve sheath    Hematologic: Negative for bruises/bleeds easily and swollen glands.      Psychiatric: Positive for depression, nervous/anxious and sleep disturbance. Negative for decreased concentration.                Objective:      Physical Exam  Vitals and nursing note reviewed. Exam conducted with a chaperone present (Patient's  accompanies her to the visit).   Constitutional:       General: She is awake.      Appearance: Normal appearance. She is well-developed and well-groomed. She is obese.   HENT:      Head: Normocephalic.      Jaw: There is normal jaw occlusion.      Salivary Glands: Right salivary gland is not diffusely enlarged or tender. Left salivary gland is not diffusely enlarged or tender.      Right Ear: Ear canal and external ear normal. Decreased hearing noted. Tympanic membrane has decreased mobility.      Left Ear: Ear canal and external ear normal. Decreased hearing noted. No drainage (thin yellow pus in ear canal), swelling or tenderness. A PE tube is present. Tympanic membrane has decreased mobility (Non mobile tympanic membrane on pneumatic otoscopy).       Ears:        Nose: Septal deviation (septum deviated to the right), mucosal edema (cyanotic, boggy inferior turbinates bilaterally) and rhinorrhea (clear mucus bilaterally) present. No nasal deformity. Rhinorrhea is clear.      Right Turbinates: Enlarged and pale.      Left Turbinates: Enlarged and pale.      Mouth/Throat:      Lips: No lesions.      Mouth: Mucous membranes are moist. No oral lesions.      Dentition: Abnormal dentition. No gum lesions.      Tongue: No lesions.      Palate: No mass and lesions.      Pharynx: Oropharynx is clear. Uvula midline.      Tonsils: 2+ on the right. 2+ on the left.   Eyes:      General: Lids are normal.         Right eye: No discharge.         Left eye: No discharge.      Extraocular Movements: Extraocular movements intact.      Conjunctiva/sclera: Conjunctivae normal.      Pupils: Pupils are equal, round, and reactive to light.      Comments: Left eye-blind   Neck:      Thyroid: No thyroid mass or thyromegaly.      Trachea: Trachea normal. No tracheal deviation.   Cardiovascular:      Rate and Rhythm: Normal rate and regular rhythm.      Pulses: Normal pulses.      Heart sounds: Normal heart sounds.   Pulmonary:      Effort: Pulmonary effort is normal.      Breath sounds: Normal breath sounds. No stridor. No decreased breath sounds, wheezing, rhonchi or rales.   Abdominal:      General: Bowel sounds are normal.      Palpations: Abdomen is soft.      Tenderness: There is no abdominal tenderness.   Musculoskeletal:         General: Normal range of motion.      Cervical back: Normal range of motion. No muscular tenderness.   Lymphadenopathy:      Head:      Right side of head: No submental, submandibular, preauricular, posterior auricular or occipital adenopathy.      Left side of head: No submental, submandibular, preauricular, posterior auricular or occipital adenopathy.      Cervical: No cervical adenopathy.   Skin:     General: Skin is warm and dry.      Findings: No  petechiae or rash.      Nails: There is no clubbing.   Neurological:      Mental Status: She is alert and oriented to person, place, and time.      Cranial Nerves: No cranial nerve deficit.      Sensory: No sensory deficit.      Gait: Gait normal.   Psychiatric:         Speech: Speech normal.         Behavior: Behavior normal. Behavior is cooperative.         Thought Content: Thought content normal.         Judgment: Judgment normal.                   Assessment:       1. Sudden left hearing loss    2. 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 Otorrhea, left    4. Allergic rhinitis, unspecified seasonality, unspecified trigger    5. Nasal septal deviation    6. Blindness of left eye with normal vision in contralateral eye              Plan:       I will have the patient continue Keflex that had been prescribed for kidney stone.  She will start using Ciprodex drops in her left ear twice daily.  She will attempt to milk the drops inside the ear.  When the cephalexin it is finished she will contact me if there is persisting hearing loss or drainage.  She will continue with the Ciprodex drops regardless.  I will recheck her in 2 weeks.                  DISCLAIMER: This note was prepared with [employer] voice recognition transcription software. Garbled syntax, mangled pronouns, and other bizarre constructions may be attributed to that software system. While efforts were made to correct any mistakes made by this voice recognition program, some errors and/or omissions may remain in the note that were missed when the note was originally created.

## 2025-06-25 ENCOUNTER — TELEPHONE (OUTPATIENT)
Dept: UROLOGY | Facility: CLINIC | Age: 65
End: 2025-06-25
Payer: MEDICARE

## 2025-06-25 LAB — BACTERIA UR CULT: NORMAL

## 2025-07-02 DIAGNOSIS — J30.9 ALLERGIC RHINITIS, UNSPECIFIED SEASONALITY, UNSPECIFIED TRIGGER: Primary | ICD-10-CM

## 2025-07-02 RX ORDER — MONTELUKAST SODIUM 10 MG/1
10 TABLET ORAL NIGHTLY
Qty: 9 TABLET | Refills: 3 | Status: SHIPPED | OUTPATIENT
Start: 2025-07-02

## 2025-07-22 ENCOUNTER — TELEPHONE (OUTPATIENT)
Dept: OTOLARYNGOLOGY | Facility: CLINIC | Age: 65
End: 2025-07-22
Payer: MEDICARE

## 2025-07-22 DIAGNOSIS — H92.12 OTORRHEA, LEFT: ICD-10-CM

## 2025-07-22 RX ORDER — CIPROFLOXACIN AND DEXAMETHASONE 3; 1 MG/ML; MG/ML
SUSPENSION/ DROPS AURICULAR (OTIC)
Qty: 7.5 ML | Refills: 3 | Status: SHIPPED | OUTPATIENT
Start: 2025-07-22

## 2025-07-24 NOTE — PROGRESS NOTES
"Subjective:       Patient ID: Estrella De Oliveira is a 65 y.o. female.    Chief Complaint: Ear Fullness  History of Present Illness    CHIEF COMPLAINT:  Patient presents today for follow up of chronic otorrhea in left ear.    LEFT EAR CONDITION:  She reports significant hearing loss in the left ear that has worsened since previous assessment. She describes the ear as feeling "dead" with minimal hearing ability, even with hearing aids. She denies current ear drainage or pain. Previous pulsating sounds have resolved since prior infection treatment.    ESOPHAGEAL STRICTURE AND SWALLOWING DIFFICULTIES:  She reports slight improvement in swallowing with less gagging but continues to have significant difficulty swallowing pills, requiring crushing or chewing medications. She has undergone multiple esophageal stretching procedures in the past. A prior medical professional advised against further procedures due to risk of esophageal perforation. Her last gastroenterologist visit was over one year ago.    ALLERGIC RHINITIS:  She is not using prescribed nasal spray due to gagging reflex and is not taking any OTC or prescription allergy medications.    ORAL HEALTH:  She is currently edentulous and has been waiting for dentures for 3-4 months. She reports a new tongue ulcer present for approximately 1.5 weeks, noting this is her first tongue ulcer in a long time.       Review of Systems     Constitutional: Negative for appetite change, chills, fatigue, fever and unexpected weight loss.      HENT: Positive for ear discharge, ear infection, ear pain, hearing loss, mouth sores, postnasal drip, ringing in the ears, stuffy nose and trouble swallowing.  Negative for facial swelling, nosebleeds, runny nose, sinus infection, sinus pressure, sore throat, tonsil infection, dental problems and voice change.      Eyes:  Positive for change in eyesight (blindness left eye). Negative for eye drainage, eye itching and photophobia. "     Respiratory:  Positive for shortness of breath. Negative for cough, sleep apnea, snoring and wheezing.      Cardiovascular:  Negative for chest pain, foot swelling, irregular heartbeat and swollen veins.     Gastrointestinal:  Positive for acid reflux and heartburn. Negative for abdominal pain, constipation, diarrhea and vomiting. Gagging    Genitourinary: Positive for frequent urination. Negative for difficulty urinating and sexual problems.     Musc: Positive for aching joints, aching muscles, back pain and neck pain. Lymphedema of left lower extremity    Skin: Negative for rash.     Allergy: Negative for food allergies and seasonal allergies. Allergic reactions    Endocrine: Negative for cold intolerance and heat intolerance.  Diabetes mellitus, type 2  Morbid obesity    Neurological: Negative for dizziness, headaches, light-headedness, seizures and tremors.  Meningioma of optic nerve sheath    Hematologic: Negative for bruises/bleeds easily and swollen glands.      Psychiatric: Positive for depression, nervous/anxious and sleep disturbance. Negative for decreased concentration.                Objective:      Physical Exam  Vitals and nursing note reviewed. Exam conducted with a chaperone present (Patient's  accompanies her to the visit).   Constitutional:       General: She is awake.      Appearance: Normal appearance. She is well-developed and well-groomed. She is obese.   HENT:      Head: Normocephalic.      Jaw: There is normal jaw occlusion.      Salivary Glands: Right salivary gland is not diffusely enlarged or tender. Left salivary gland is not diffusely enlarged or tender.      Right Ear: Ear canal and external ear normal. Decreased hearing noted. Tympanic membrane has decreased mobility.      Left Ear: Ear canal and external ear normal. Decreased hearing noted. No drainage (thin yellow pus in ear canal), swelling or tenderness. A PE tube (T-tube in place) is present. Tympanic membrane has  decreased mobility (Non mobile tympanic membrane on pneumatic otoscopy).      Ears:        Nose: Septal deviation (septum deviated to the right), mucosal edema (cyanotic, boggy inferior turbinates bilaterally) and rhinorrhea (clear mucus bilaterally) present. No nasal deformity. Rhinorrhea is clear.      Right Turbinates: Enlarged and pale.      Left Turbinates: Enlarged and pale.      Mouth/Throat:      Lips: No lesions.      Mouth: Mucous membranes are moist. No oral lesions.      Dentition: Abnormal dentition. No gum lesions.      Tongue: No lesions.      Palate: No mass and lesions.      Pharynx: Oropharynx is clear. Uvula midline.      Tonsils: 2+ on the right. 2+ on the left.   Eyes:      General: Lids are normal.         Right eye: No discharge.         Left eye: No discharge.      Extraocular Movements: Extraocular movements intact.      Conjunctiva/sclera: Conjunctivae normal.      Pupils: Pupils are equal, round, and reactive to light.      Comments: Left eye-blind   Neck:      Thyroid: No thyroid mass or thyromegaly.      Trachea: Trachea normal. No tracheal deviation.   Cardiovascular:      Rate and Rhythm: Normal rate and regular rhythm.      Pulses: Normal pulses.      Heart sounds: Normal heart sounds.   Pulmonary:      Effort: Pulmonary effort is normal.      Breath sounds: Normal breath sounds. No stridor. No decreased breath sounds, wheezing, rhonchi or rales.   Abdominal:      General: Bowel sounds are normal.      Palpations: Abdomen is soft.      Tenderness: There is no abdominal tenderness.   Musculoskeletal:         General: Normal range of motion.      Cervical back: Normal range of motion. No muscular tenderness.   Lymphadenopathy:      Head:      Right side of head: No submental, submandibular, preauricular, posterior auricular or occipital adenopathy.      Left side of head: No submental, submandibular, preauricular, posterior auricular or occipital adenopathy.      Cervical: No cervical  adenopathy.   Skin:     General: Skin is warm and dry.      Findings: No petechiae or rash.      Nails: There is no clubbing.   Neurological:      Mental Status: She is alert and oriented to person, place, and time.      Cranial Nerves: No cranial nerve deficit.      Sensory: No sensory deficit.      Gait: Gait normal.   Psychiatric:         Speech: Speech normal.         Behavior: Behavior normal. Behavior is cooperative.         Thought Content: Thought content normal.         Judgment: Judgment normal.                 I personally reviewed today's audiogram and discuss it in full detail with the patient during her visit.  Although she complains that her hearing has worsened there is no significant change/worsening of her pure tone thresholds when compared to previous audiogram, Type B tympanogram on the left indicates PE tube still patent    Assessment:       1. Sudden left hearing loss    2. 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 Allergic rhinitis, unspecified seasonality, unspecified trigger    4. Nasal septal deviation    5. Pulsatile tinnitus, left ear    6. Dysphagia, unspecified type    7. Change in hearing of left ear    8. History of esophageal stricture                Plan:       Assessment & Plan    H91.22 Sudden left hearing loss  H90.A32 Mixed conductive and sensorineural hearing loss of left ear with restricted hearing of right ear  J30.9 Allergic rhinitis, unspecified seasonality, unspecified trigger  J34.2 Nasal septal deviation  H93.A2 Pulsatile tinnitus, left ear  R13.10 Dysphagia, unspecified type  H91.92 Change in hearing of left ear  Z87.19 History of esophageal stricture    SUDDEN LEFT HEARING LOSS:  - Chronic otorrhea in left ear, no current drainage but significant hearing loss.    MIXED CONDUCTIVE AND SENSORINEURAL HEARING LOSS OF LEFT EAR WITH RESTRICTED HEARING OF RIGHT EAR:  - Mixed conductive and sensorineural hearing loss  in left ear with restricted hearing in right.  - PE tube in ear is patent based on results.    ALLERGIC RHINITIS, UNSPECIFIED SEASONALITY, UNSPECIFIED TRIGGER:  - Allergic rhinitis contributing to ear and sinus symptoms.  - Attributed ear, hearing, and sinus symptoms to untreated allergies.  - Started Montelukast (generic Singulair) daily for allergy symptoms, instructed patient to crush tablet if swallowing difficulties persist.    DYSPHAGIA, UNSPECIFIED TYPE:  - Instructed patient to crush tablet if swallowing difficulties persist.  - Referred to Dr. Gomez (or local equivalent if needed) for evaluation of esophageal stricture and swallowing issues.    CHANGE IN HEARING OF LEFT EAR:  - Ordered audiogram due to reported change in hearing, results showed no significant change from previous test.    HISTORY OF ESOPHAGEAL STRICTURE:  - Reviewed esophageal stricture history and current swallowing status.  - Referred to Dr. Gomez (or local equivalent if needed) for evaluation of esophageal stricture and swallowing issues.                     I will have the patient continue Keflex that had been prescribed for kidney stone.  She will start using Ciprodex drops in her left ear twice daily.  She will attempt to milk the drops inside the ear.  When the cephalexin it is finished she will contact me if there is persisting hearing loss or drainage.  She will continue with the Ciprodex drops regardless.  I will recheck her in 2 weeks.                  DISCLAIMER: This note was prepared with [employer] voice recognition transcription software. Garbled syntax, mangled pronouns, and other bizarre constructions may be attributed to that software system. This note was generated with the assistance of ambient listening technology. Verbal consent was obtained by the patient and accompanying visitor(s) for the recording of patient appointment to facilitate this note. I attest to having reviewed and edited the generated note for accuracy,  though some syntax or spelling errors may persist. Please contact the author of this note for any clarification.

## 2025-07-25 ENCOUNTER — CLINICAL SUPPORT (OUTPATIENT)
Dept: OTOLARYNGOLOGY | Facility: CLINIC | Age: 65
End: 2025-07-25
Payer: MEDICARE

## 2025-07-25 ENCOUNTER — OFFICE VISIT (OUTPATIENT)
Dept: OTOLARYNGOLOGY | Facility: CLINIC | Age: 65
End: 2025-07-25
Payer: MEDICARE

## 2025-07-25 VITALS
OXYGEN SATURATION: 97 % | WEIGHT: 229.75 LBS | HEART RATE: 77 BPM | SYSTOLIC BLOOD PRESSURE: 165 MMHG | DIASTOLIC BLOOD PRESSURE: 77 MMHG | BODY MASS INDEX: 38.23 KG/M2

## 2025-07-25 DIAGNOSIS — H90.A32 MIXED CONDUCTIVE AND SENSORINEURAL HEARING LOSS OF LEFT EAR WITH RESTRICTED HEARING OF RIGHT EAR: ICD-10-CM

## 2025-07-25 DIAGNOSIS — H91.22 SUDDEN LEFT HEARING LOSS: Primary | ICD-10-CM

## 2025-07-25 DIAGNOSIS — J34.2 NASAL SEPTAL DEVIATION: ICD-10-CM

## 2025-07-25 DIAGNOSIS — H90.A21 SENSORINEURAL HEARING LOSS (SNHL) OF RIGHT EAR WITH RESTRICTED HEARING OF LEFT EAR: ICD-10-CM

## 2025-07-25 DIAGNOSIS — J30.9 ALLERGIC RHINITIS, UNSPECIFIED SEASONALITY, UNSPECIFIED TRIGGER: ICD-10-CM

## 2025-07-25 DIAGNOSIS — H93.A2 PULSATILE TINNITUS, LEFT EAR: ICD-10-CM

## 2025-07-25 DIAGNOSIS — R13.10 DYSPHAGIA, UNSPECIFIED TYPE: ICD-10-CM

## 2025-07-25 DIAGNOSIS — Z87.19 HISTORY OF ESOPHAGEAL STRICTURE: ICD-10-CM

## 2025-07-25 DIAGNOSIS — H91.92 CHANGE IN HEARING OF LEFT EAR: ICD-10-CM

## 2025-07-25 PROCEDURE — 99999 PR PBB SHADOW E&M-EST. PATIENT-LVL V: CPT | Mod: PBBFAC,,, | Performed by: SPECIALIST

## 2025-07-25 PROCEDURE — 99999 PR PBB SHADOW E&M-EST. PATIENT-LVL I: CPT | Mod: PBBFAC,,, | Performed by: AUDIOLOGIST

## 2025-07-25 RX ORDER — MONTELUKAST SODIUM 10 MG/1
10 TABLET ORAL NIGHTLY
Qty: 90 TABLET | Refills: 3 | Status: SHIPPED | OUTPATIENT
Start: 2025-07-25

## 2025-07-25 NOTE — PROGRESS NOTES
Ms. Estrella D eOliveira was seen in the clinic today for a follow-up audiological evaluation. She reported that her left ear still feels clogged and hearing is no better since previous audio.    Audiological testing revealed hearing in the normal sloping to the mild to moderately-severe sensorineural range for the right ear and revealed hearing in the mild to severe mixed range for the left ear.  A speech reception threshold was obtained at 30 dBHL for the right ear and at 40 dBHL for the left ear.  Speech discrimination scores were 96% for the right ear and 88% for the left ear.      Tympanometry testing revealed a Type A tympanogram for the right ear and revealed a Type B normal ear canal volume tympanogram for the left ear.      Recommendations:  Otologic evaluation, as needed  Annual audiological evaluation  Hearing protection in noise  Continue daily use of binaural amplification  Follow-up PRN with dispensing audiologist

## 2025-08-27 ENCOUNTER — TELEPHONE (OUTPATIENT)
Dept: UROLOGY | Facility: CLINIC | Age: 65
End: 2025-08-27
Payer: MEDICARE